# Patient Record
Sex: FEMALE | Race: WHITE
[De-identification: names, ages, dates, MRNs, and addresses within clinical notes are randomized per-mention and may not be internally consistent; named-entity substitution may affect disease eponyms.]

---
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
## 2019-04-23 ENCOUNTER — HOSPITAL ENCOUNTER (EMERGENCY)
Dept: HOSPITAL 25 - UCEAST | Age: 19
Discharge: HOME | End: 2019-04-23
Payer: COMMERCIAL

## 2019-04-23 VITALS — DIASTOLIC BLOOD PRESSURE: 63 MMHG | SYSTOLIC BLOOD PRESSURE: 113 MMHG

## 2019-04-23 DIAGNOSIS — Z91.048: ICD-10-CM

## 2019-04-23 DIAGNOSIS — S49.92XA: Primary | ICD-10-CM
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
## 2019-04-23 DIAGNOSIS — Z88.4: ICD-10-CM
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
## 2019-04-23 DIAGNOSIS — Z88.5: ICD-10-CM

## 2019-04-23 DIAGNOSIS — Z88.8: ICD-10-CM

## 2019-04-23 DIAGNOSIS — Z88.7: ICD-10-CM

## 2019-04-23 PROCEDURE — G0463 HOSPITAL OUTPT CLINIC VISIT: HCPCS

## 2019-04-23 PROCEDURE — 99212 OFFICE O/P EST SF 10 MIN: CPT

## 2019-04-23 NOTE — UC
Shoulder Pain HPI





- HPI Summary


HPI Summary: 





20 yo female with the onset of Left shoulder pain about one hour after doing 

some pullups


Pain is anterior


pain with abduction


no neck pain














- History of Current Complaint


Chief Complaint: UCUpperExtremity


Stated Complaint: ARM PAIN


Time Seen by Provider: 04/23/19 18:17


Hx Obtained From: Patient


Hx Last Menstrual Period: daily birth control, LMP unknown


Onset/Duration: Gradual Onset, Lasting Hours


Timing: Constant


Severity Initially: Mild


Severity Currently: Moderate


Pain Intensity: 6


Pain Scale Used: 0-10 Numeric


Character: Aching, Throbbing


Aggravating Factor(s): Movement, Abduction


Alleviating Factor(s): Rest


Associated Signs And Symptoms: Positive: Negative


Related History: Dominant Hand Right


Torso: 


  __________________________














  __________________________





 1 - pain here, painful when she attemtps to abduct >90








- Allergies/Home Medications


Allergies/Adverse Reactions: 


 Allergies











Allergy/AdvReac Type Severity Reaction Status Date / Time


 


chlorhexidine Allergy Severe Hives/Diff. Verified 04/23/19 17:42





   Breathing/I  





   tching  


 


tramadol Allergy Severe Rash Verified 04/23/19 17:42


 


nickel Allergy Intermediate Rash Verified 04/23/19 17:42


 


famotidine Allergy  Rash Verified 04/23/19 17:42


 


lidocaine Allergy  Hives Verified 04/23/19 17:42


 


hepatits b vaccine Allergy Severe Anaphylatic Uncoded 04/23/19 17:42





   Shock  


 


ADHESIVES Allergy Intermediate Rash Uncoded 04/23/19 17:42


 


ALLERGY SHOT Allergy Unknown Anaphylatic Uncoded 04/23/19 17:42





   Shock  


 


TAGIDERM Allergy  Hives Uncoded 04/23/19 17:42











Home Medications: 


 Home Medications





Atenolol TAB* [Tenormin TAB* 25 MG] 25 mg PO DAILY 04/23/19 [History Confirmed 

04/23/19]











PMH/Surg Hx/FS Hx/Imm Hx


Previously Healthy: Yes


Cardiovascular History: Other - recurrent VT,  ablations x 2


GI/ History: Ulcer





- Surgical History


Surgical History: Yes


Surgery Procedure, Year, and Place: CARDIAC ABLASION 12/29/15 and possible 

2017.  WISDOM TEETH.  Loop monitor placement and removal 2018





- Family History


Known Family History: Positive: Hypertension, Other - anasthesia reaction - 

mother (vomiting), no malignant hyperthermia





- Social History


Alcohol Use: Weekly


Substance Use Type: Marijuana


Substance Use Comment - Amount & Last Used: occasionally for joint pain per pt.


Smoking Status (MU): Never Smoked Tobacco


Have You Smoked in the Last Year: No





Review of Systems


All Other Systems Reviewed And Are Negative: Yes


Constitutional: Positive: Negative


Skin: Positive: Negative


Eyes: Positive: Negative


ENT: Positive: Negative


Respiratory: Positive: Negative


Cardiovascular: Positive: Negative


Gastrointestinal: Positive: Negative


Genitourinary: Positive: Negative


Motor: Positive: Negative


Neurovascular: Positive: Negative


Musculoskeletal: Positive: Arthralgia


Neurological: Positive: Negative


Psychological: Positive: Negative





Physical Exam


Triage Information Reviewed: Yes


Appearance: Well-Appearing, No Pain Distress, Well-Nourished


Vital Signs: 


 Initial Vital Signs











Temp  99.2 F   04/23/19 17:36


 


Pulse  89   04/23/19 17:36


 


Resp  16   04/23/19 17:36


 


BP  113/63   04/23/19 17:36


 


Pulse Ox  99   04/23/19 17:36











Vital Signs Reviewed: Yes


Eyes: Positive: Conjunctiva Clear


ENT: Positive: Hearing grossly normal.  Negative: Nasal congestion, Nasal 

drainage, Trismus, Muffled voice, Hoarse voice


Dental Exam: Normal


Neck: Positive: Supple, Nontender, No Lymphadenopathy


Respiratory: Positive: Lungs clear, Normal breath sounds, No respiratory 

distress, No accessory muscle use, Respiratory distress


Cardiovascular: Positive: RRR, No Murmur.  Negative: Tachycardia, Bradycardia


Musculoskeletal: Positive: ROM Limited @ - able to abduct to 90 degrees (L 

shoulder), full int and ext ROM


Neurological: Positive: Alert


Psychological Exam: Normal


Skin Exam: Normal





Shoulder Course/Dx





- Differential Dx/Diagnosis


Provider Diagnosis: 


 Injury of left shoulder








Discharge





- Sign-Out/Discharge


Documenting (check all that apply): Patient Departure


All imaging exams completed and their final reports reviewed: No Studies





- Discharge Plan


Condition: Stable


Disposition: HOME


Patient Education Materials:  How to Use a Sling (ED), Shoulder Sprain (ED)


Referrals: 


[facility] [Outside] (I suggest you get rechecked next week 

if not better)


Additional Instructions: 


sling


ice


tylenol











- Billing Disposition and Condition


Condition: STABLE


Disposition: Home

## 2019-04-23 NOTE — XMS REPORT
Continuity of Care Document (CCD)

 Created on:2019



Patient:Beba Orlando

Sex:Female

:2000

External Reference #:[id number]





Demographics







 Address  [address] [place] [address]

 

 Home Phone  [phone]

 

 Mobile Phone  [phone]

 

 Email Address  [email or web address]

 

 Preferred Language  en

 

 Marital Status  Not  or 

 

 Catholic Affiliation  Unknown

 

 Race  White

 

 Ethnic Group  Not  or 









Author







 Name  HOWARD Gomez

 

 Address  [address]



   Unavailable



   [place] [address]









Support







 Name  Relationship  Address  Phone

 

 Lisbeth Orlando  Mother  Unavailable  [phone]









Care Team Providers







 Name  Role  Phone

 

 Tristan Delgado  Care Team Information   Unavailable

 

 Tristan Delgado  Primary Care Physician  Unavailable









Payers







 Date  Identification Numbers  Payment Provider  Subscriber

 

 Effective: 2017  Policy Number: [id number]  BC/BS Of [employer] Greenean Karlos









 PayID: [id number]  [address] [place] [id number]







Advance Directives







 Description

 

 No Information Available







Problems







 Active Problems  Provider  Date

 

 Lyme disease  Tristan Delgado M.D.  Onset: 2015

 

 Paroxysmal ventricular tachycardia  Tristan Delgado M.D.  Onset: 2015

 

 Epigastric pain  Tristan Delgado M.D.  Onset: 2015

 

 Acute upper respiratory infection  Tristan Delgado M.D.  Onset: 2015

 

 Contusion of foot  Tristan Delgado M.D.  Onset: 2015

 

 Open wound of toe without complication  Tristan Delgado M.D.  Onset: 2015

 

 Myalgia  Tristan Delgado M.D.  Onset: 2015

 

 Inflammatory polyarthropathy  Tristan Delgado M.D.  Onset: 2015

 

 Infectious colitis, enteritis and  Tristan Delgado M.D.  Onset: 01/15/2016



 gastroenteritis    

 

 Sarcoid heart muscle disease  Tristan Delgado M.D.  Onset: 2016

 

 Malaise and fatigue  Tristan Delgado M.D.  Onset: 2016

 

 High risk sexual behavior  Tristan Delgado M.D.  Onset: 2016

 

 Pain of breast  Tristan Delgado M.D.  Onset: 2017

 

 Generalized anxiety disorder  Tristan Delgado M.D.  Onset: 2017

 

 Posttraumatic stress disorder  Tristan Delgado M.D.  Onset: 2017

 

 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
2017



 predominantly inattentive type    

 

 Acute bronchitis  Tristan Delgado M.D.  Onset: 10/15/2018

 

 Chest pain  Tristan Delgado M.D.  Onset: 10/15/2018







Family History







 Date  Family Member(s)  Observation  Comments

 

   Father  Hypothyroidism  

 

   Father  62  

 

   Father  Osteoporosis  

 

   Father  Osteoarthritis  

 

   Mother  Asthma  

 

   Mother  50  

 

   Mother  Seasonal Allergies  

 

   Mother  Irritable Bowel Syndrome  







Social History







 Type  Date  Description  Comments

 

 Birth Sex    Unknown  

 

 Marital Status    Single  

 

 Lives With    Male Partner  

 

 Diet    Healthy, Well Balanced  

 

 Tobacco Use  Start: Unknown  Never Smoked Cigarettes  

 

 Smoking Status  Reviewed: 18  Never Smoked Cigarettes  

 

 ETOH Use    Denies alcohol use  

 

 Tobacco Use  Start: Unknown  nonsmoker  

 

 Recreational Drug Use    Marijuana  occ

 

 Exercise Type/Frequency    Exercises sporadically  

 

 Sun Exposure    Uses sunscreen  occ also does not



       stay in strong sun

 

 Seat Belt/Car Seat    Always uses seat belt  







Allergies, Adverse Reactions, Alerts







 Active Allergies  Reaction  Severity  Comments  Date

 

 Hepatitis B Vaccine  Anaphylaxis      2015

 

 Nickel        2015

 

 Environmental        2015

 

 Pepcid  full body rash      2015

 

 Chlorhexidine  blistering rash      2015

 

 Tramadol  hives, difficulty breathing  Severe    10/12/2015

 

 Adhesives  Contact dermatitis    except paper tape  2015

 

 Topical Mark Meds        2017







Medications







 Active Medications  SIG  Qnty  Indications  Ordering  Date



         Provider  

 

 Note  Due To Health  Needs to be able to      Tristan Delgado,  10/15/2018



 Issues  use elevator due to      M.D.  



   heart condition.        

 

 Ondansetron  take one tablet by  30tabs    Tristan Delgado,  2017



           4mg Tablets  mouth four times a      M.D.  



 Dispers  day as needed for        



   nausea        

 

 Atenolol  1  po bid prn      Unknown  



        50mg Tablets          



           

 

 Ogestrel  take 1 tablet by  3packs    Brenda Mora  



        0.5-50mg-mcg  mouth every day      SNEHAL Major  



 Tablets  continuously        



           

 

 Desloratadine  1 by mouth every      Unknown  



             5mg  day        



 Tablets          



           









 History Medications









 Azithromycin  2 by mouth today  6tabs  J20.9  Tristan Delgado,  10/15/2018 -



              250mg  and 1 by mouth x 4      M.D.  2018



 Tablets  days        



           

 

 Macrobid  take one by mouth  10caps  R30.0  Gemma CHAWLA  2018 -



          100mg  twice daily until      Tejas, SNEHAL  2018



 Capsules  gone        



           

 

 Strattera  1 PO AT hs  30caps  F90.0  Tristan Delgado,  2017 -



           25mg        M.D.  2018



 Capsules          



           

 

 Bupropion HCL ER  take 1 tablet by  30tabs  F90.0  Tristan Delgado,  10/24/2017 
-



 (SR)  mouth every      M.D.  2017



      100mg Tablets  morning        



 ER 12HR          



           

 

 Note For Work  was  seen  here      Nicho JURADO  2017 -



   15  and      ROLAN Mireles  2017



   could not work  or        



    be at  school        



   that  day        

 

 Note  Due To Health  Needs to be    F41.1  Tristan Delgado,  2017 -



 Issues  allowed to have an      M.DCandido  2018



   emotional support        



   dog.        

 

 Atovaquone  10 ML PO qd  210ml    Tristan Delgado,  2017 -



         M.D.  2017



 750mg/5ML          



 Suspension          



           

 

 Doxycycline  Take 1 Capsule By  60caps    Nicho JURADO  2017 -



 Monohydrate  Mouth Two Times      ROLAN Mireles  2017



             100mg  Daily        



 Capsules          



           

 

 Note  Due To Health  Unable to work at      Tristan Delgado,  2016 -



 Issues  present time due      M.DCandido  10/18/2016



   to fatigue from        



   chronic lyme        



   disease and heart        



   disease.        

 

 Note  please remove picc      Tristan Delgado,  2016 -



   line      M.D.  2016

 

 PICC Line Dressing  weekly and kian      Nicho JURADO  2016 -



 Changes  picc line dressing      ROLAN Mireles  2016



   changes per        



   protocal        

 

 Penicillin V  1 by mouth twice a  20tabs    Jamar Echavarria,  2015 -



 Potassium  day      M.D.  2016



           500mg          



 Tablets          



           

 

 Lansoprazole  take one capsule      [facility]  2015 -



              30mg  by mouth every day      [facility]  10/18/2016



 Capsules DR  as needed      [place]  



           

 

 Fluconazole  1 by mouth for  2tabs    Tristan Delgado,  2015 -



             150mg  yeast infection.      M.D.  2015



 Tablets  may repeat in 5-7        



   days        

 

 Note  discontinue PICC      Tristan Delgado,  10/30/2015 -



   line      M.D.  2015

 

 PICC Line Dressing  Weekly picc line      Jacquelyn Ramos,  2015 -



 Changes  dressing changes      M.D.  2016



   per protocal        

 

 Ceftriaxone Sodium  infuse 5 days per    A69.29  Tristan Delgado,  2015 -



   week times 30 days      M.D.  2016



 2gm Solution Rec          



           

 

 Nystatin  apply to affected  1bottle    Jacquelyn Ramos,  2015 -



           Powder  area as directed      M.D.  2015



           

 

 Heparin Lock Flush  as directed  30units    Tristan Delgado,  2015 -



 For Flushing        M.D.  10/30/2015



 Vascular Access          



 Devices          



         100Unit/ML          



 Solution          



           

 

 Famotidine  1 twice a day as  60tabs  789.06  Tristan Delgado,  2015 -



            20mg  needed for stomach      M.D.  2015



 Tablets          



           

 

 Ceftriaxone Sodium  Infuse 5 days per    088.81  Tristan Delgado,  2015 -



   week times 30 days      M.D.  2015



 2gm Solution Rec          



           

 

 Amoxicillin  Take One Tablet By  90tabs  A69.29  Tristan Delgado,  2015 -



             875mg  Mouth Three Times      M.D.  2015



 Tablets  A Day        



           

 

 Atovaquone-Proguani  1 by mouth every  14tabs  088.81  Tristan Delgado,  2015 -



 l HCL  day      M.D.  2015



       250-100mg          



 Tablets          



           

 

 Naproxen  Take One Tablet By  60tabs    Tristan Delgado,  2015 -



          500mg  Mouth Twice A Day      M.D.  2015



 Tablets  as Needed For Pain        



           

 

 Azithromycin  Take One Tablet By  30tabs  088.81  Tristan Delgado,  2015 -



              500mg  Mouth Daily      M.D.  2015



 Tablets          



           

 

 Cefuroxime Axetil  1 by mouth twice a  60tabs  088.81  Tristan Delgado,  2015 -



   day      M.D.  2015



 500mg Tablets          



           

 

 Birth Control Pill        Tristan Delgado,  2015 -



         M.D.  10/12/2015

 

 Azithromycin  1 by mouth every      Unknown   -



   day        2016



 Tablets          



           

 

 Mepron  5ml bid      Unknown   -



         Suspension          2016



           

 

 Atenolol  1 by mouth twice      Unknown   -



          25mg  daily        2017



 Tablets          



           

 

 Metoprolol Tartrate  take one tablet by  60tabs    Tristan Delgado,   
-



   mouth twice a day      M.D.  2016



 25mg Tablets          



           

 

 Ibuprofen  2 by mouth every 6      Unknown   -



           200mg  hours as needed        01/15/2016



 Tablets          



           

 

 Probiotic  2 po bid      Unknown   -



            Capsules          2016



           

 

 Flecainide Acetate  2 by mouth twice a      Unknown   -



   day        2017



 50mg Tablets          



           

 

 Hydrocodone-Acetami  1 every 6 hours as  40tabs    Jamar Echavarria,   -



 nophen  needed for tooth      M.D.  2016



        5-325mg  pain - disregard        



 Tablets  previous Rx for        



   "every 12 hours"        



   from today        

 

 Tramadol HCL  1 by mouth every 6      Unknown   -



              50mg  hours as needed        10/12/2015



 Tablets          



           

 

 Naproxen  1 po bid      Unknown   -



           Tablets          2015



           

 

 Flecainide Acetate  1 po bid      Unknown   -



           2015



 100mg Tablets          



           







Medications Administered in Office







 Medication  SIG  Qnty  Indications  Ordering Provider  Date

 

 TB Intradermal Test        Jacinda Baxter  2018



             Injection          



           

 

 TB Intradermal Test        Tristan Delgado M.D.  2016



             Injection          



           







Immunizations







 CPT Code  Status  Date  Vaccine  Lot #

 

 [id number]  Given  2018  Tdap Tetanus, W Pertussis  [id number]

 

 37894  Given  2013  Hepatitis B Immunization, [place]-19 Years  

 

 88100  Given  10/18/2012  Hepatitis B Immunization, [place]-19 Years  

 

 15924  Given  2012  Hepatitis B Immunization, [place]-19 Years  

 

 56268  Given  2008  Tdap Tetanus, W Pertussis  

 

 74960  Given  2007  MMR Virus Immunization  

 

 92814  Given  2001  MMR Virus Immunization  

 

 01600  Given  1999  DTaP Immunization  

 

 69812  Given  1999  Hib PRP-T Conjugate 4 Dose Schedule  

 

 92707  Given  1998  DTaP Immunization  

 

 68438  Given  1998  (IPV) Inactive Poliovirus Vaccine  

 

 35805  Given  1998  (IPV) Inactive Poliovirus Vaccine  

 

 07892  Given  1998  DTaP Immunization  

 

 10745  Given  1998  Hib PRP-T Conjugate 4 Dose Schedule  

 

 04518  Given  1998  Varicella (Chicken Pox) Immunization  

 

 71050  Given  1998  (IPV) Inactive Poliovirus Vaccine  

 

 50300  Given  1998  DTaP Immunization  

 

 20636  Given  1998  Hib PRP-T Conjugate 4 Dose Schedule  







Vital Signs







 Date  Vital  Result  Comment

 

 2019  4:15pm  BP Systolic  118 mmHg  









 BP Diastolic  62 mmHg  

 

 Heart Rate  76 /min  

 

 Body Temperature  98.7 F  

 

 Respiratory Rate  17 /min  

 

 Height  63.5 inches  5'3.50"

 

 Weight  150.00 lb  

 

 BMI (Body Mass Index)  26.2 kg/m2  









 2019  3:53pm  BP Systolic  120 mmHg  









 BP Diastolic  80 mmHg  

 

 Heart Rate  68 /min  

 

 Body Temperature  98.8 F  

 

 Respiratory Rate  16 /min  

 

 Height  63.5 inches  5'3.50"

 

 Weight  154.00 lb  

 

 BMI (Body Mass Index)  26.8 kg/m2  









 10/15/2018  4:48pm  BP Systolic  100 mmHg  









 BP Diastolic  60 mmHg  

 

 Heart Rate  80 /min  

 

 Body Temperature  99.2 F  

 

 Respiratory Rate  18 /min  

 

 Height  63.5 inches  5'3.50"

 

 Weight  142.00 lb  

 

 BMI (Body Mass Index)  24.8 kg/m2  









 2018  2:42pm  BP Systolic  128 mmHg  









 BP Diastolic  60 mmHg  

 

 Heart Rate  68 /min  

 

 Body Temperature  98.6 F  

 

 Respiratory Rate  16 /min  

 

 Height  63.5 inches  5'3.50"

 

 Weight  134.00 lb  

 

 BMI (Body Mass Index)  23.4 kg/m2  

 

 Right Visual Acuity Distance  20/20  

 

 Left Visual Acuity Distance  20/20  









 2018  6:24pm  BP Systolic  120 mmHg  









 BP Diastolic  70 mmHg  

 

 Heart Rate  68 /min  

 

 Body Temperature  98.8 F  

 

 Respiratory Rate  18 /min  

 

 Height  63.5 inches  5'3.50"  measured 17

 

 Weight  133.00 lb  

 

 BMI (Body Mass Index)  23.2 kg/m2  









 2018  2:57pm  BP Systolic  108 mmHg  









 BP Diastolic  60 mmHg  

 

 Heart Rate  72 /min  

 

 Body Temperature  97.6 F  

 

 Respiratory Rate  17 /min  

 

 Height  63.5 inches  5'3.50"  measured 2018  8:45am  BP Systolic  112 mmHg  









 BP Diastolic  60 mmHg  

 

 Heart Rate  64 /min  

 

 Body Temperature  98.2 F  

 

 Height  63.5 inches  5'3.50"  measured 17

 

 Weight  134.00 lb  

 

 BMI (Body Mass Index)  23.4 kg/m2  









 2017  1:59pm  BP Systolic  100 mmHg  









 BP Diastolic  64 mmHg  

 

 Heart Rate  84 /min  

 

 Body Temperature  97.7 F  

 

 Respiratory Rate  16 /min  

 

 Height  63.5 inches  5'3.50"  measured 17

 

 Weight  138.50 lb  

 

 BMI (Body Mass Index)  24.1 kg/m2  

 

 Body Mass Index Percentile  73 %  

 

 Weight Percentile  67th  

 

 Height Percentile  38 %  









 10/24/2017 11:34am  BP Systolic  100 mmHg  









 BP Diastolic  60 mmHg  

 

 Heart Rate  90 /min  

 

 Body Temperature  97.3 F  

 

 Respiratory Rate  16 /min  

 

 Height  63.5 inches  5'3.50"  measured 17

 

 Weight  142.00 lb  

 

 BMI (Body Mass Index)  24.8 kg/m2  

 

 Body Mass Index Percentile  77 %  

 

 Weight Percentile  72nd  

 

 Height Percentile  38 %  









 2017  3:35pm  BP Systolic  100 mmHg  









 BP Diastolic  60 mmHg  

 

 Heart Rate  80 /min  

 

 Body Temperature  98.5 F  

 

 Respiratory Rate  16 /min  

 

 O2 % BldC Oximetry  98 %  

 

 Height  63.5 inches  5'3.50"  measured 17

 

 Weight  137.38 lb  

 

 BMI (Body Mass Index)  24.0 kg/m2  

 

 Body Mass Index Percentile  72 %  

 

 Weight Percentile  66th  

 

 Height Percentile  38 %  









 2017  8:19pm  BP Systolic  104 mmHg  









 BP Diastolic  60 mmHg  

 

 Heart Rate  78 /min  

 

 Body Temperature  98.4 F  

 

 Respiratory Rate  16 /min  

 

 Height  63.5 inches  5'3.50"  measured 17

 

 Weight  142.38 lb  

 

 BMI (Body Mass Index)  24.8 kg/m2  

 

 Body Mass Index Percentile  78 %  

 

 Right Visual Acuity Distance  20/20  

 

 Left Visual Acuity Distance  20.20  









 2017 10:59am  BP Systolic  90 mmHg  









 BP Diastolic  50 mmHg  

 

 Heart Rate  68 /min  

 

 Body Temperature  98.8 F  

 

 Respiratory Rate  16 /min  

 

 Height  64 inches  5'4"

 

 Weight  139.00 lb  

 

 BMI (Body Mass Index)  23.9 kg/m2  

 

 Body Mass Index Percentile  72 %  









 2017  4:11pm  BP Systolic  98 mmHg  









 BP Diastolic  60 mmHg  

 

 Heart Rate  72 /min  

 

 Body Temperature  99.3 F  

 

 Respiratory Rate  16 /min  

 

 Height  64 inches  5'4"

 

 Weight  136.38 lb  

 

 BMI (Body Mass Index)  23.4 kg/m2  

 

 Body Mass Index Percentile  69 %  









 10/18/2016 10:32am  BP Systolic  110 mmHg  









 BP Diastolic  60 mmHg  

 

 Heart Rate  72 /min  

 

 Body Temperature  97.7 F  

 

 Height  64 inches  5'4"

 

 Weight  132.00 lb  

 

 BMI (Body Mass Index)  22.7 kg/m2  

 

 Body Mass Index Percentile  63 %  

 

 Weight Percentile  62nd  

 

 Height Percentile  46 %  









 2016  8:27am  BP Systolic  96 mmHg  









 BP Diastolic  60 mmHg  

 

 Heart Rate  72 /min  

 

 Body Temperature  98.2 F  

 

 Respiratory Rate  16 /min  

 

 Weight  137.00 lb  

 

 Weight Percentile  702016  1:59pm  BP Systolic  118 mmHg  









 BP Diastolic  64 mmHg  

 

 Heart Rate  72 /min  

 

 Body Temperature  98.7 F  

 

 Respiratory Rate  16 /min  

 

 Weight  132.00 lb  

 

 Weight Percentile  63rd  









 2016  2:24pm  BP Systolic  110 mmHg  









 BP Diastolic  68 mmHg  

 

 Heart Rate  68 /min  

 

 Body Temperature  98.3 F  

 

 Respiratory Rate  16 /min  

 

 Weight  130.00 lb  

 

 Weight Percentile  60th  









 2016  4:17pm  BP Systolic  108 mmHg  lying pulse 74









 BP Diastolic  52 mmHg  lying pulse 74

 

 BP Systolic Recheck  100 mmHg  sitting pulse 74

 

 BP Diastolic Recheck  58 mmHg  sitting pulse 74









 2016  1:12pm  BP Systolic  112 mmHg  









 BP Diastolic  70 mmHg  

 

 Heart Rate  68 /min  

 

 Body Temperature  98.7 F  

 

 Respiratory Rate  18 /min  

 

 Weight  132.00 lb  

 

 Weight Percentile  64th  









 2016 11:37am  BP Systolic  114 mmHg  









 BP Diastolic  62 mmHg  

 

 Heart Rate  80 /min  

 

 Body Temperature  98.4 F  

 

 Respiratory Rate  16 /min  

 

 Weight  129.38 lb  

 

 Weight Percentile  602016 11:24am  BP Systolic  90 mmHg  









 BP Diastolic  60 mmHg  

 

 Heart Rate  84 /min  

 

 Body Temperature  97.7 F  

 

 Respiratory Rate  16 /min  

 

 Weight  133.12 lb  

 

 Weight Percentile  66th  









 01/15/2016  2:16pm  BP Systolic  106 mmHg  









 BP Diastolic  64 mmHg  

 

 Heart Rate  78 /min  

 

 Body Temperature  98.1 F  

 

 Respiratory Rate  16 /min  

 

 Weight  130.38 lb  

 

 Weight Percentile  62nd  









 2016  4:32pm  BP Systolic  106 mmHg  









 BP Diastolic  44 mmHg  

 

 Heart Rate  96 /min  

 

 Body Temperature  100.0 F  

 

 Respiratory Rate  16 /min  

 

 Weight  131.25 lb  

 

 Weight Percentile  64th  









 2015  1:59pm  BP Systolic  108 mmHg  









 BP Diastolic  68 mmHg  

 

 Heart Rate  96 /min  

 

 Body Temperature  99.3 F  

 

 Respiratory Rate  16 /min  

 

 Weight  131.00 lb  

 

 Weight Percentile  63rd  









 2015  4:38pm  BP Systolic  106 mmHg  









 BP Diastolic  60 mmHg  

 

 Heart Rate  120 /min  

 

 Body Temperature  98.7 F  

 

 Respiratory Rate  16 /min  

 

 Weight  128.00 lb  

 

 Weight Percentile  59th  









 2015  1:22pm  BP Systolic  120 mmHg  









 BP Diastolic  74 mmHg  

 

 Heart Rate  96 /min  

 

 Body Temperature  100.8 F  

 

 Respiratory Rate  16 /min  

 

 Height  63 inches  5'3"

 

 Weight  127.00 lb  

 

 BMI (Body Mass Index)  22.5 kg/m2  

 

 Body Mass Index Percentile  65 %  

 

 Weight Percentile  57th  

 

 Height Percentile  32 %  









 10/12/2015  2:13pm  BP Systolic  100 mmHg  









 BP Diastolic  60 mmHg  

 

 Heart Rate  64 /min  

 

 Body Temperature  98.5 F  

 

 Respiratory Rate  16 /min  

 

 Height  63 inches  5'3"

 

 Weight  129.25 lb  

 

 BMI (Body Mass Index)  22.9 kg/m2  

 

 Body Mass Index Percentile  69 %  

 

 Weight Percentile  61st  

 

 Height Percentile  32 %  









 2015  2:54pm  BP Systolic  104 mmHg  









 BP Diastolic  60 mmHg  

 

 Heart Rate  84 /min  

 

 Body Temperature  98.6 F  

 

 Respiratory Rate  16 /min  

 

 Height  63 inches  5'3"

 

 Weight  125.00 lb  

 

 BMI (Body Mass Index)  22.1 kg/m2  

 

 Body Mass Index Percentile  62 %  

 

 Weight Percentile  54th  

 

 Height Percentile  32 %  









 2015  5:59pm  BP Systolic  116 mmHg  









 BP Diastolic  54 mmHg  

 

 Heart Rate  90 /min  

 

 Body Temperature  99.1 F  

 

 Respiratory Rate  16 /min  

 

 Height  63 inches  5'3"

 

 Weight  124.12 lb  

 

 BMI (Body Mass Index)  22.0 kg/m2  

 

 Body Mass Index Percentile  60 %  

 

 Weight Percentile  52nd  

 

 Height Percentile  32 %  









 2015  3:21pm  BP Systolic  110 mmHg  









 BP Diastolic  60 mmHg  

 

 Heart Rate  88 /min  

 

 Body Temperature  99.0 F  

 

 Respiratory Rate  16 /min  

 

 Height  63 inches  5'3"

 

 Height Percentile  32 %  









 2015 11:28am  BP Systolic  98 mmHg  









 BP Diastolic  50 mmHg  

 

 Heart Rate  80 /min  

 

 Body Temperature  100.4 F  

 

 Respiratory Rate  16 /min  

 

 Height  63 inches  5'3"

 

 Weight  122.00 lb  

 

 BMI (Body Mass Index)  21.6 kg/m2  

 

 Body Mass Index Percentile  56 %  

 

 Weight Percentile  48th  

 

 Height Percentile  32 %  









 2015 10:03am  BP Systolic  96 mmHg  









 BP Diastolic  50 mmHg  

 

 Heart Rate  84 /min  

 

 Body Temperature  99.0 F  

 

 Respiratory Rate  16 /min  

 

 Height  63 inches  5'3"

 

 Weight  118.25 lb  

 

 BMI (Body Mass Index)  20.9 kg/m2  

 

 Body Mass Index Percentile  48 %  

 

 Weight Percentile  41st  

 

 Height Percentile  32 %  









 2015  3:08pm  BP Systolic  104 mmHg  









 BP Diastolic  60 mmHg  

 

 Heart Rate  84 /min  

 

 Body Temperature  99.2 F  

 

 Respiratory Rate  16 /min  

 

 Height  63 inches  5'3"

 

 Weight  121.38 lb  

 

 BMI (Body Mass Index)  21.5 kg/m2  

 

 Body Mass Index Percentile  55 %  

 

 Weight Percentile  47th  

 

 Height Percentile  32 %  









 2015  1:45pm  BP Systolic  90 mmHg  









 BP Diastolic  60 mmHg  

 

 Heart Rate  84 /min  

 

 Body Temperature  98.2 F  

 

 Respiratory Rate  12 /min  

 

 Height  63 inches  5'3"

 

 Weight  117.38 lb  

 

 BMI (Body Mass Index)  20.8 kg/m2  

 

 Body Mass Index Percentile  47 %  

 

 Weight Percentile  39th  

 

 Height Percentile  32 %  









 2015  4:47pm  BP Systolic  90 mmHg  









 BP Diastolic  54 mmHg  

 

 Heart Rate  84 /min  

 

 Body Temperature  99.3 F  

 

 Respiratory Rate  16 /min  

 

 Weight  121.00 lb  

 

 Weight Percentile  47th  









 2015  2:35pm  BP Systolic  96 mmHg  









 BP Diastolic  68 mmHg  

 

 Heart Rate  90 /min  

 

 Body Temperature  99.0 F  

 

 Respiratory Rate  16 /min  

 

 Weight  125.00 lb  

 

 Weight Percentile  55th  









 2015 12:02pm  BP Systolic  100 mmHg  









 BP Diastolic  60 mmHg  

 

 Heart Rate  84 /min  

 

 Body Temperature  98.7 F  

 

 Respiratory Rate  16 /min  

 

 Weight  127.12 lb  

 

 Weight Percentile  59th  









 2015  2:45pm  BP Systolic  122 mmHg  









 BP Diastolic  60 mmHg  

 

 Heart Rate  84 /min  

 

 Body Temperature  98.9 F  

 

 Respiratory Rate  16 /min  

 

 Weight  130.12 lb  

 

 Weight Percentile  64th  









 2015  9:54am  BP Systolic  124 mmHg  









 BP Diastolic  60 mmHg  

 

 Heart Rate  90 /min  

 

 Body Temperature  99.5 F  

 

 Respiratory Rate  16 /min  

 

 Height  63 inches  5'3" measured 4/9/15

 

 Weight  130.38 lb  

 

 BMI (Body Mass Index)  23.1 kg/m2  

 

 Body Mass Index Percentile  72 %  

 

 Weight Percentile  65th  

 

 Height Percentile  32 %  









 2015  2:38pm  BP Systolic  122 mmHg  









 BP Diastolic  60 mmHg  

 

 Heart Rate  84 /min  

 

 Body Temperature  99.1 F  

 

 Respiratory Rate  16 /min  

 

 Height  63 inches  measured 4/9/15

 

 Weight  128.50 lb  

 

 BMI (Body Mass Index)  22.8 kg/m2  

 

 Body Mass Index Percentile  69 %  

 

 Weight Percentile  62nd  

 

 Height Percentile  33 %  







Results







 Test  Date  Facility  Test  Result  H/L  Range  Note

 

 Laboratory test  2019  [facility]  Clotest  SEE RESULT      1, 2



 finding        BELOW      

 

 Laboratory test  2019  [facility]  Surgical  SEE RESULT      3, 4



 finding      Interface Order  BELOW      

 

 Laboratory test  2018  [facility]  Quickstrep  NEGATIVE    Negative
  



 finding    (607)-   -          

 

 CBC Auto Diff  2018  [facility]  White Blood  6.7 10^3/uL  N  3.5-10.8  5



       Count        









 Red Blood Count  4.64 10^6/uL  N  4.0-5.4  

 

 Hemoglobin  14.2 g/dL  N  12.0-16.0  

 

 Hematocrit  42 %  N  35-47  

 

 Mean Corpuscular Volume  90 fL  N  80-97  

 

 Mean Corpuscular Hemoglobin  31 pg  N  27-31  

 

 Mean Corpuscular HGB Conc  34 g/dL  N  31-36  

 

 Red Cell Distribution Width  13 %  N  10.5-15  

 

 Platelet Count  257 10^3/uL  N  150-450  

 

 Mean Platelet Volume  9.6 um3  N  7.4-10.4  

 

 Abs Neutrophils  4.3 10^3/uL  N  1.5-7.7  

 

 Abs Lymphocytes  1.8 10^3/uL  N  1.0-4.8  

 

 Abs Monocytes  0.5 10^3/uL  N  0-0.8  

 

 Abs Eosinophils  0 10^3/uL  N  0-0.6  

 

 Abs Basophils  0.1 10^3/uL  N  0-0.2  

 

 Abs Nucleated RBC  0 10^3/uL      

 

 Granulocyte %  64.0 %  N  38-83  

 

 Lymphocyte %  27.0 %  N  25-47  

 

 Monocyte %  7.5 %  High  0-7  

 

 Eosinophil %  0.5 %  N  0-6  

 

 Basophil %  1.0 %  N  0-2  

 

 Nucleated Red Blood Cells %  0      









 Comp Metabolic Panel  2018  [facility]  Sodium  138 mmol/L  Low  139-145  









 Potassium  4.0 mmol/L  N  3.5-5.0  

 

 Chloride  107 mmol/L  N  101-111  

 

 Co2 Carbon Dioxide  24 mmol/L  N  22-32  

 

 Anion Gap  7 mmol/L  N  2-11  

 

 Glucose  78 mg/dL  N    

 

 Blood Urea Nitrogen  6 mg/dL  N  6-24  

 

 Creatinine  0.66 mg/dL  N  0.51-0.95  

 

 BUN/Creatinine Ratio  9.1  N  8-20  

 

 Calcium  9.6 mg/dL  N  8.6-10.3  

 

 Total Protein  6.6 g/dL  N  6.4-8.9  

 

 Albumin  4.2 g/dL  N  3.2-5.2  

 

 Globulin  2.4 g/dL  N  2-4  

 

 Albumin/Globulin Ratio  1.8  N  1-3  

 

 Total Bilirubin  0.30 mg/dL  N  0.2-1.0  

 

 Alkaline Phosphatase  62 U/L  N    

 

 Alt  18 U/L  N  7-52  

 

 Ast  15 U/L  N  13-39  

 

 Egfr Non-  114.2    >60  

 

 Egfr   146.8    >60  6









 Laboratory test finding  2018  [facility]  Magnesium  2.0 mg/dL  N  1.9-2.7  7









 HCG Pregnancy  < 0.60 mIU/mL      8

 

 TSH (Thyroid Stim Horm)  1.28 mcIU/mL  N  0.34-5.60  9









 Laboratory test  2018  [facility]  Poc Pregnancy,  Negative    Negative  10



 finding      Urine        

 

 Laboratory test  2018  [facility]  Tryptase  2.5 ng/mL    <11.5  11



 finding              

 

 Laboratory test  2018  [facility]  Urine Culture And  SEE RESULT      12



 finding      Sensitivities  BELOW      

 

 Ua - Micro ([facility])  2018  [facility]  Appearance  slightly  #    



     (607)-   -    cloudy      









 Color  yellow      

 

 Glucose, Urine ([facility])  neg      

 

 Bilirubin  neg      

 

 Ketones  neg      

 

 SP Grav  >=1.030      

 

 Blood  Trace-lysed  #    

 

 PH  6.0      

 

 Protein  trace ssa  #    

 

 Urobil  0.2      

 

 Nitrite  Negative      

 

 Leukocytes ([facility])  sm  #    

 

 Hyaline  - /Lpf      

 

 Granular  - /Lpf      

 

 WBC ([facility]ma,[facility])  50-60  #    

 

 RBC  3-5  #    

 

 Mucus (Fma/CBC/[facility]rex)  - /Lpf      

 

 Epith  few /Lpf  #    

 

 Bacteria  3+ /Hpf  #    

 

 Amorphous ([facility])  - /Lpf      

 

 Crystals, Fluid ([facility])  -      

 

 Z#Comments  -      









 Ua - Micro ([facility]ma)  2017  [facility]  Appearance  slightly cloudy    
  



     (607)-   -          









 Color  yellow      

 

 Glucose, Urine ([facility])  neg      

 

 Bilirubin  neg      

 

 Ketones  neg      

 

 SP Grav  1.010      

 

 Blood  trace-intact  #    

 

 PH  6.0      

 

 Protein  neg      

 

 Urobil  0.2      

 

 Nitrite  neg      

 

 Leukocytes ([facility])  small  #    

 

 WBC ([facility]ma,[facility])  7-8  #    

 

 RBC  0-2  #    

 

 Epith  rare /Lpf  #    

 

 Bacteria  2+ /Hpf  #    









 Lyme, Western Blot,  2017  [facility]  IgG P93 Ab.  Absent      13



 Serum    [address] [place] [address]          



     (607)-   -          









 IgG P66 Ab.  Absent      

 

 IgG P58 Ab.  Absent      

 

 IgG P45 Ab.  Absent      

 

 IgG P41 Ab.  Absent      

 

 IgG P39 Ab.  Absent      

 

 IgG P30 Ab.  Absent      

 

 IgG P28 Ab.  Absent      

 

 IgG P23 Ab.  Absent      

 

 IgG P18 Ab.  Absent      

 

 Lyme IgG WB Interp.  Negative      14

 

 IgM P41 Ab.  Absent      

 

 IgM P39 Ab.  Absent      

 

 IgM P23 Ab.  Present  Abnormal    

 

 Lyme IgM WB Interp.  Negative      15









 CBC Auto Diff  2017  [facility]  White Blood Count  7.4 10^3/uL  N  3.5-10.8  









 Red Blood Count  4.74 10^6/uL  N  4.0-5.4  

 

 Hemoglobin  14.3 g/dL  N  12.0-16.0  

 

 Hematocrit  42 %  N  35-47  

 

 Mean Corpuscular Volume  89 fL  N  80-97  

 

 Mean Corpuscular Hemoglobin  30 pg  N  27-31  

 

 Mean Corpuscular HGB Conc  34 g/dL  N  31-36  

 

 Red Cell Distribution Width  12 %  N  10.5-15  

 

 Platelet Count  278 10^3/uL  N  150-450  

 

 Mean Platelet Volume  9 um3  N  7.4-10.4  

 

 Abs Neutrophils  3.6 10^3/uL  N  1.5-7.7  

 

 Abs Lymphocytes  2.8 10^3/uL  N  1.0-4.8  

 

 Abs Monocytes  0.8 10^3/uL  N  0-0.8  

 

 Abs Eosinophils  0.1 10^3/uL  N  0-0.6  

 

 Abs Basophils  0.1 10^3/uL  N  0-0.2  

 

 Abs Nucleated RBC  0 10^3/uL  N    

 

 Granulocyte %  49.2 %  N  38-83  

 

 Lymphocyte %  37.8 %  N  25-47  

 

 Monocyte %  11.1 %  High  1-9  

 

 Eosinophil %  1.0 %  N  0-6  

 

 Basophil %  0.9 %  N  0-2  

 

 Nucleated Red Blood Cells %  0.1  N    









 Laboratory test finding  2017  [facility]  Lactic Acid  1.2 mmol/L  N  0.5-2.0  
16

 

 Laboratory test finding  2017  [facility]  Magnesium  2.3 mg/dL  N  1.9-2.7  









 Troponin I  0.00 ng/mL  N  <0.04  17









 Comp Metabolic Panel  2017  [facility]  Sodium  137 mmol/L  N  133-145  









 Potassium  3.5 mmol/L  N  3.5-5.0  

 

 Chloride  106 mmol/L  N  101-111  

 

 Co2 Carbon Dioxide  27 mmol/L  N  22-32  

 

 Anion Gap  4 mmol/L  N  2-11  

 

 Glucose  71 mg/dL  N    

 

 Blood Urea Nitrogen  7 mg/dL  N  6-24  

 

 Creatinine  0.71 mg/dL  N  0.51-0.95  

 

 BUN/Creatinine Ratio  9.9  N  8-20  

 

 Calcium  9.3 mg/dL  N  8.6-10.3  

 

 Total Protein  7.1 g/dL  N  6.4-8.9  

 

 Albumin  4.2 g/dL  N  3.2-5.2  

 

 Globulin  2.9 g/dL  N  2-4  

 

 Albumin/Globulin Ratio  1.4  N  1-3  

 

 Total Bilirubin  0.20 mg/dL  N  0.2-1.0  

 

 Alkaline Phosphatase  59 U/L  N    

 

 Alt  12 U/L  N  7-52  

 

 Ast  11 U/L  Low  13-39  

 

 Egfr Non-  107.2  N  >60  

 

 Egfr   137.9  N  >60  18









 Laboratory test finding  10/18/2016  [facility]  TSH  0.88 mIU/L    0.50-
6.00  









 Free T4  1.32 ng/dL    0.75-1.54  

 

 Free T3  3.98 pg/mL    2.00-4.90  









 Laboratory test  2016  [facility]  HCG Pregnancy  < 0.60 mIU/mL  N    19



 finding              

 

 Laboratory test  2016  [facility]  TSH (Thyroid Stim  0.44 mcIU/mL  N  0.34-
5.60  



 finding      Horm)        









 T3 Free  5.10 pg/mL  High  2.5-3.9  

 

 Free T4 (Free Thyroxine)  0.91 ng/dL  N  0.61-1.12  









 Liver Function Panel  2016  [facility]  Direct Bilirubin  0.10 mg/dL  N  0.03-
0.18  









 Indirect Bilirubin  0.4 mg/dL  N  0.3-1.0  









 Comp Metabolic Panel  2016  [facility]  Sodium  136 mmol/L  N  133-145  









 Potassium  4.1 mmol/L  N  3.5-5.0  

 

 Chloride  104 mmol/L  N  101-111  

 

 Co2 Carbon Dioxide  25 mmol/L  N  22-32  

 

 Anion Gap  7 mmol/L  N  2-11  

 

 Glucose  108 mg/dL  High    

 

 Blood Urea Nitrogen  10 mg/dL  N  6-24  

 

 Creatinine  0.71 mg/dL  N  0.51-0.95  

 

 BUN/Creatinine Ratio  14.1  N  8-20  

 

 Calcium  9.7 mg/dL  N  8.6-10.3  

 

 Total Protein  6.8 g/dL  N  6.4-8.9  

 

 Albumin  4.4 g/dL  N  3.2-5.2  

 

 Globulin  2.4 g/dL  N  2-4  

 

 Albumin/Globulin Ratio  1.8  N  1-3  

 

 Total Bilirubin  0.50 mg/dL  N  0.2-1.0  

 

 Alkaline Phosphatase  62 U/L  N    

 

 Alt  19 U/L  N  7-52  

 

 Ast  17 U/L  N  13-39  

 

 Egfr Non-  107.2  N  >60  

 

 Egfr   137.9  N  >60  20









 CBC Auto Diff  2016  [facility]  White Blood Count  7.9 10^3/uL  N  3.5-10.8  









 Red Blood Count  4.72 10^6/uL  N  4.0-5.4  

 

 Hemoglobin  14.3 g/dL  N  12.0-16.0  

 

 Hematocrit  42 %  N  35-47  

 

 Mean Corpuscular Volume  90 fL  N  80-97  

 

 Mean Corpuscular Hemoglobin  30 pg  N  27-31  

 

 Mean Corpuscular HGB Conc  34 g/dL  N  31-36  

 

 Red Cell Distribution Width  13 %  N  10.5-15  

 

 Platelet Count  244 10^3/uL  N  150-450  

 

 Mean Platelet Volume  9 um3  N  7.4-10.4  

 

 Abs Neutrophils  5.8 10^3/uL  N  1.5-7.7  

 

 Abs Lymphocytes  1.7 10^3/uL  N  1.0-4.8  

 

 Abs Monocytes  0.3 10^3/uL  N  0-0.8  

 

 Abs Eosinophils  0 10^3/uL  N  0-0.6  

 

 Abs Basophils  0 10^3/uL  N  0-0.2  

 

 Abs Nucleated RBC  0 10^3/uL  N    

 

 Granulocyte %  73.5 %  N  38-83  

 

 Lymphocyte %  21.3 %  Low  25-47  

 

 Monocyte %  4.0 %  N  1-9  

 

 Eosinophil %  0.6 %  N  0-6  

 

 Basophil %  0.6 %  N  0-2  

 

 Nucleated Red Blood Cells %  0.1  N    









 Laboratory test  2016  [facility]  Pregnancy,  neg      



 finding    (607)-   -  Serum        

 

 Laboratory test  2016  [facility]  RPR  Non Reactive    Non Reactive  21



 finding    [address] [place] [address]          



     (607)-   -          

 

 HIV 1/O/2 Ag/AB  2016  [facility]  HIV Screen 4th  Non Reactive    Non 
Reactive  



 Prelim    [address] [facility]  [address]        



 [address] [place] [address]          



 Sup    (607)-   -          

 

 Laboratory test  2016  [facility]  HCV Antibody  <0.1    0.0-0.9  22



 finding    [address] [facility]    s/coratio      



     [place] [address]          



     (607)-   -          

 

 Urinalysis  2016  [facility]  Urine Color  Straw  N    



 Profile              









 Urine Appearance  Cloudy  N    

 

 Urine Specific Gravity  1.003  Low  1.010-1.030  

 

 Urine pH  6.0  N  5-9  

 

 Urine Urobilinogen  Negative  N  Negative  

 

 Urine Ketones  Negative  N  Negative  

 

 Urine Protein  Negative  N  Negative  

 

 Urine Leukocytes  3+  Abnormal  Negative  

 

 Urine Blood  Negative  N  Negative  

 

 Urine Nitrite  Negative  N  Negative  

 

 Urine Bilirubin  Negative  N  Negative  

 

 Urine Glucose  Negative  N  Negative  

 

 Urine White Blood Cell  1+(6-10/hpf)  Abnormal  Absent  

 

 Urine Red Blood Cell  Absent  N  Absent  

 

 Urine Bacteria  1+  Abnormal  Absent  

 

 Urine Squamous Epithelial Cell  Present  Abnormal  Absent  

 

 Urine Transitional Epithelial  Present  Abnormal  Absent  









 Laboratory test  2016  [facility]  Urine Culture And  SEE RESULT BELOW      23



 finding      Sensitivities        

 

 Laboratory test  2016  [facility]  Partial Thrombo Time  28.5 seconds  N  26.0-
36.  



 finding      PTT      3  









 D Dimer Quantitative  < 200 ng/mL  N  Less Than 230  24









 CBC Auto Diff  2016  [facility]  White Blood Count  6.7 10^3/uL  N  3.5-10.8  









 Red Blood Count  4.63 10^6/uL  N  4.0-5.4  

 

 Hemoglobin  13.9 g/dL  N  12.0-16.0  

 

 Hematocrit  41 %  N  35-47  

 

 Mean Corpuscular Volume  89 fL  N  80-97  

 

 Mean Corpuscular Hemoglobin  30 pg  N  27-31  

 

 Mean Corpuscular HGB Conc  34 g/dL  N  31-36  

 

 Red Cell Distribution Width  13 %  N  10.5-15  

 

 Platelet Count  295 10^3/uL  N  150-450  

 

 Mean Platelet Volume  9 um3  N  7.4-10.4  

 

 Abs Neutrophils  3.7 10^3/uL  N  1.5-7.7  

 

 Abs Lymphocytes  2.2 10^3/uL  N  1.0-4.8  

 

 Abs Monocytes  0.5 10^3/uL  N  0-0.8  

 

 Abs Eosinophils  0.1 10^3/uL  N  0-0.6  

 

 Abs Basophils  0.2 10^3/uL  N  0-0.2  

 

 Abs Nucleated RBC  0 10^3/uL  N    

 

 Granulocyte %  55.4 %  N  38-83  

 

 Lymphocyte %  32.9 %  N  25-47  

 

 Monocyte %  7.4 %  N  1-9  

 

 Eosinophil %  1.2 %  N  0-6  

 

 Basophil %  3.1 %  High  0-2  

 

 Nucleated Red Blood Cells %  0.1  N    









 Comp Metabolic Panel  2016  [facility]  Sodium  136 mmol/L  N  133-145  









 Potassium  3.8 mmol/L  N  3.5-5.0  

 

 Chloride  104 mmol/L  N  101-111  

 

 Co2 Carbon Dioxide  25 mmol/L  N  22-32  

 

 Anion Gap  7 mmol/L  N  2-11  

 

 Glucose  108 mg/dL  High    

 

 Blood Urea Nitrogen  8 mg/dL  N  6-24  

 

 Creatinine  0.71 mg/dL  N  0.51-0.95  

 

 BUN/Creatinine Ratio  11.3  N  8-20  

 

 Calcium  9.8 mg/dL  N  8.6-10.3  

 

 Total Protein  7.3 g/dL  N  6.4-8.9  

 

 Albumin  4.4 g/dL  N  3.2-5.2  

 

 Globulin  2.9 g/dL  N  2-4  

 

 Albumin/Globulin Ratio  1.5  N  1-3  

 

 Total Bilirubin  0.20 mg/dL  N  0.2-1.0  

 

 Alkaline Phosphatase  52 U/L  N    

 

 Alt  17 U/L  N  7-52  

 

 Ast  14 U/L  N  13-39  

 

 Egfr Non-  107.2  N  >60  

 

 Egfr   137.9  N  >60  25









 Laboratory test finding  2016  [facility]  Magnesium  2.3 mg/dL  N  1.9-2.7  









 Lipase  17 U/L  N  11.0-82.0  

 

 C Reactive Protein  1.15 mg/L  N  < 5.00  26

 

 Troponin I  0.00 ng/mL  N  <0.03  27









 CKMB  2016  [facility]  CKMB  ng/mL  0.8 ng/mL  N  0.6-6.3  

 

 Laboratory test finding  2016  [facility]  HCG Pregnancy  < 0.60 mIU/mL  N    28









 TSH (Thyroid Stim Horm)  1.44 ?IU/mL  N  0.34-5.60  

 

 B Type Natriuretic Peptide  27 pg/mL  N    29

 

 Lactic Acid  2.2 mmol/L  High  0.5-2.0  30









 No Test Indicated  2016  [facility]  .  See Comment:      31, 32



     [address] [place] [address]          



     (930)-   -          









 Dear Doctor,  See Comment:      33









 Laboratory test  2016  [facility]  No Frozen Received  TNP      34



 finding    [address] [place] [address]          



     (189)-   -          









 Angiotensin-Converting Enzyme  35 U/L    14-82  

 

 Written Authorization  See Comment:      35









 Laboratory test finding  2016  [facility]  Erythrocyte Sed Rate  7 mm/Hr  N  0-
14  

 

 CBC Auto Diff  2016  [facility]  White Blood Count  8.5 10^3/uL  N  3.5-10.8  









 Red Blood Count  5.23 10^6/uL  N  4.0-5.4  

 

 Hemoglobin  15.2 g/dL  N  12.0-16.0  

 

 Hematocrit  47 %  N  35-47  

 

 Mean Corpuscular Volume  89 fL  N  80-97  

 

 Mean Corpuscular Hemoglobin  29 pg  N  27-31  

 

 Mean Corpuscular HGB Conc  33 g/dL  N  31-36  

 

 Red Cell Distribution Width  13 %  N  10.5-15  

 

 Platelet Count  273 10^3/uL  N  150-450  

 

 Mean Platelet Volume  9 um3  N  7.4-10.4  

 

 Abs Neutrophils  5.4 10^3/uL  N  1.5-7.7  

 

 Abs Lymphocytes  2.3 10^3/uL  N  1.0-4.8  

 

 Abs Monocytes  0.5 10^3/uL  N  0-0.8  

 

 Abs Eosinophils  0.3 10^3/uL  N  0-0.6  

 

 Abs Basophils  0 10^3/uL  N  0-0.2  

 

 Abs Nucleated RBC  0 10^3/uL  N    

 

 Granulocyte %  63.6 %  N  38-83  

 

 Lymphocyte %  26.7 %  N  25-47  

 

 Monocyte %  5.7 %  N  1-9  

 

 Eosinophil %  3.4 %  N  0-6  

 

 Basophil %  0.6 %  N  0-2  

 

 Nucleated Red Blood Cells %  0  N    









 Laboratory test finding  2016  [facility]  Inr/Protime  0.97  N  0.89-1.11  









 Partial Thrombo Time PTT  30.7 seconds  N  26.0-36.3  









 Comp Metabolic Panel  2016  [facility]  Sodium  136 mmol/L  N  133-145  









 Potassium  3.8 mmol/L  N  3.5-5.0  

 

 Chloride  104 mmol/L  N  101-111  

 

 Co2 Carbon Dioxide  26 mmol/L  N  22-32  

 

 Anion Gap  6 mmol/L  N  2-11  

 

 Glucose  92 mg/dL  N    

 

 Blood Urea Nitrogen  9 mg/dL  N  6-24  

 

 Creatinine  0.77 mg/dL  N  0.51-0.95  

 

 BUN/Creatinine Ratio  11.7  N  8-20  

 

 Calcium  9.7 mg/dL  N  8.6-10.3  

 

 Total Protein  7.7 g/dL  N  6.4-8.9  

 

 Albumin  4.8 g/dL  N  3.2-5.2  

 

 Globulin  2.9 g/dL  N  2-4  

 

 Albumin/Globulin Ratio  1.7  N  1-3  

 

 Total Bilirubin  0.30 mg/dL  N  0.2-1.0  

 

 Alkaline Phosphatase  64 U/L  N    

 

 Alt  22 U/L  N  7-52  

 

 Ast  16 U/L  N  13-39  

 

 Egfr Non-  97.6  N  >60  

 

 Egfr   125.6  N  >60  36









 Laboratory test finding  2016  [facility]  HCG Pregnancy  < 0.60 mIU/mL  N    37









 TSH (Thyroid Stim Horm)  1.20 ?IU/mL  N  0.34-5.60  









 CKMB  2016  [facility]  CKMB  ng/mL  0.5 ng/mL  Low  0.6-6.3  

 

 Laboratory test finding  2016  [facility]  Magnesium  2.2 mg/dL  N  1.9-2.7  









 C Reactive Protein  < 1.00 mg/L  N  < 5.00  38

 

 Troponin I  0.00 ng/mL  N  <0.03  39









 Basic Metabolic Panel  2016  [facility]  Sodium  140 mmol/L  N  133-145  









 Potassium  4.1 mmol/L  N  3.5-5.0  

 

 Chloride  105 mmol/L  N  101-111  

 

 Co2 Carbon Dioxide  28 mmol/L  N  22-32  

 

 Anion Gap  7 mmol/L  N  2-11  

 

 Glucose  95 mg/dL  N    

 

 Blood Urea Nitrogen  8 mg/dL  N  6-24  

 

 Creatinine  0.66 mg/dL  N  0.51-0.95  

 

 BUN/Creatinine Ratio  12.1  N  8-20  

 

 Calcium  9.5 mg/dL  N  8.6-10.3  

 

 Egfr Non-  116.6  N  >60  

 

 Egfr   150.0  N  >60  40









 Complete Blood Count  2016  [facility]  WBC  5.7 x10^3/UL    3.6-9.6
  









 RBC  4.98 x10^6/UL    3.90-5.70  

 

 HGB  14.6 g/dL    12.1-17.2  

 

 HCT  44 %    36-50  

 

 MCV  88.0 fL    82.2-97.4  

 

 MCH  29.3 pg    27.6-33.3  

 

 MCHC  33.3 g/dL    33.0-35.5  

 

 RDW  13.7 %    11.6-13.7  

 

 PLT  267 x10^3/UL    150-400  

 

 MPV  8.2 fL    7.4-10.4  

 

 Gran #  3.8 x10^3/UL    1.5-7.2  

 

 Lymph#  1.7 x10^3/UL    0.7-4.9  

 

 Mono#  0.2 x10^3/UL    0.1-0.9  

 

 Gran %  65.3 %    42.2-75.2  

 

 Lymph %  31.2 %    20.5-51.1  

 

 Mono%  3.5 %    1.7-9.3  









 Comprehensive Metabolic Prof  2016  [facility]  Sodium  140 mEq/L    
134-149  









 Potassium  4.1 mEq/L    3.6-5.5  

 

 Chloride  105 mEq/L      

 

 Carbon Dioxide  28 mEq/L    21-32  

 

 Glucose  100 mg/dL      

 

 BUN  7 mg/dL    6-26  

 

 Creatinine  0.6 mg/dL    0.6-1.4  

 

 BUN/Creat Ratio  11.7 CALC    8.0-36.0  

 

 Calcium  10.1 mg/dL    8.6-10.2  

 

 Total Protein  7.5 g/dL    6.4-8.3  

 

 Albumin  4.9 g/dL    3.8-5.5  

 

 Globulin  2.6 g/dL    2.0-4.8  

 

 A/G Ratio  1.9 CALC    0.6-2.3  

 

 Alk. Phosphatase  81 U/L      

 

 Alt (SGPT)  23 U/L    7-35  

 

 Ast (Sgot)  27 U/L    5-34  

 

 Total Bilirubin  0.3 mg/dL    0.2-1.3  

 

 GFR Non-African American  >60 ml/min/1.73m^    >=60  

 

 GFR African American  >60 ml/min/1.73m^    >=60  









 Laboratory test finding  2016  [facility]  Saliva Cortisol  186 ng/dL  N  100-
750  41

 

 Laboratory test finding  2016  [facility]  Saliva Cortisol  131 ng/dL  N    42

 

 Laboratory test finding  2016  [facility]  Saliva Cortisol  240 ng/dL  N    43

 

 Laboratory test finding  2016  [facility]  Saliva Cortisol  <50 ng/dL  N  <100  
44

 

 Laboratory test finding  2016  [facility]  Rapid Strep A  SEE RESULT BELOW      
45









 Rapid Influenza A & B Antigen  SEE RESULT BELOW      46









 Comp Metabolic Panel  2016  [facility]  Sodium  137 mmol/L  N  133-145  









 Potassium  3.8 mmol/L  N  3.5-5.0  

 

 Chloride  105 mmol/L  N  101-111  

 

 Co2 Carbon Dioxide  23 mmol/L  N  22-32  

 

 Anion Gap  9 mmol/L  N  2-11  

 

 Glucose  128 mg/dL  High    

 

 Blood Urea Nitrogen  14 mg/dL  N  6-24  

 

 Creatinine  0.65 mg/dL  N  0.51-0.95  

 

 BUN/Creatinine Ratio  21.5  High  8-20  

 

 Calcium  8.9 mg/dL  N  8.6-10.3  

 

 Total Protein  6.7 g/dL  N  6.4-8.9  

 

 Albumin  4.2 g/dL  N  3.2-5.2  

 

 Globulin  2.5 g/dL  N  2-4  

 

 Albumin/Globulin Ratio  1.7  N  1-3  

 

 Total Bilirubin  0.30 mg/dL  N  0.2-1.0  

 

 Alkaline Phosphatase  80 U/L  N    

 

 Alt  22 U/L  N  7-52  

 

 Ast  17 U/L  N  13-39  

 

 Egfr Non-  118.7  N  >60  

 

 Egfr   152.7  N  >60  47









 Rapid Influenza A & B  2016  [facility]  Influenza A Molecular  NEGATIVE  N  
Negative  48



 Molecular              









 Influenza B Molecular  NEGATIVE  N  Negative  









 Laboratory test finding  2016  [facility]  Rapid Strep  Molecular  Negative  N  
Negative  49









 Throat Beta Strep Culture  SEE RESULT BELOW      50









 CBC Auto Diff  2016  [facility] [place]  White Blood Count  12.3 10^3/uL  High  3.5-
10.8  









 Red Blood Count  5.06 10^6/uL  N  4.0-5.4  

 

 Hemoglobin  14.5 g/dL  N  12.0-16.0  

 

 Hematocrit  43 %  N  35-47  

 

 Mean Corpuscular Volume  86 fL  N  80-97  

 

 Mean Corpuscular Hemoglobin  29 pg  N  27-31  

 

 Mean Corpuscular HGB Conc  34 g/dL  N  31-36  

 

 Red Cell Distribution Width  13 %  N  10.5-15  

 

 Platelet Count  218 10^3/uL  N  150-450  

 

 Mean Platelet Volume  9 um3  N  7.4-10.4  

 

 Abs Neutrophils  11.1 10^3/uL  High  1.5-7.7  

 

 Abs Lymphocytes  0.5 10^3/uL  Low  1.0-4.8  

 

 Abs Monocytes  0.6 10^3/uL  N  0-0.8  

 

 Abs Eosinophils  0.1 10^3/uL  N  0-0.6  

 

 Abs Basophils  0 10^3/uL  N  0-0.2  

 

 Abs Nucleated RBC  0 10^3/uL  N    

 

 Granulocyte %  89.8 %  High  38-83  

 

 Lymphocyte %  4.1 %  Low  25-47  

 

 Monocyte %  4.7 %  N  1-9  

 

 Eosinophil %  1.2 %  N  0-6  

 

 Basophil %  0.2 %  N  0-2  

 

 Nucleated Red Blood Cells %  0  N    









 CBC Auto Diff  2016  [facility] [place]  White Blood Count  6.1 10^3/uL  N  3.5-10.8  









 Red Blood Count  4.88 10^6/uL  N  4.0-5.4  

 

 Hemoglobin  14.0 g/dL  N  12.0-16.0  

 

 Hematocrit  43 %  N  35-47  

 

 Mean Corpuscular Volume  87 fL  N  80-97  

 

 Mean Corpuscular Hemoglobin  29 pg  N  27-31  

 

 Mean Corpuscular HGB Conc  33 g/dL  N  31-36  

 

 Red Cell Distribution Width  12 %  N  10.5-15  

 

 Platelet Count  340 10^3/uL  N  150-450  

 

 Mean Platelet Volume  8 um3  N  7.4-10.4  

 

 Abs Neutrophils  3.1 10^3/uL  N  1.5-7.7  

 

 Abs Lymphocytes  2.2 10^3/uL  N  1.0-4.8  

 

 Abs Monocytes  0.5 10^3/uL  N  0-0.8  

 

 Abs Eosinophils  0.1 10^3/uL  N  0-0.6  

 

 Abs Basophils  0.1 10^3/uL  N  0-0.2  

 

 Abs Nucleated RBC  0 10^3/uL  N    

 

 Granulocyte %  51.7 %  N  38-83  

 

 Lymphocyte %  36.7 %  N  25-47  

 

 Monocyte %  8.9 %  N  1-9  

 

 Eosinophil %  1.1 %  N  0-6  

 

 Basophil %  1.6 %  N  0-2  

 

 Nucleated Red Blood Cells %  0  N    









 Laboratory test finding  2016  [facility]  Magnesium  2.1 mg/dL  N  1.9-2.7  









 Troponin I  0.00 ng/mL  N  <0.03  51

 

 HCG Pregnancy  < 0.60 mIU/mL  N    52

 

 TSH (Thyroid Stim Horm)  1.22 ?IU/mL  N  0.34-5.60  









 Inr/Protime  2016  [facility]  Inr  0.95  N  0.89-1.11  

 

 Laboratory test finding  2016  [facility]  Partial Thrombo  28.9 seconds  N  
26.0-36.3  



       Time PTT        









 B Type Natriuretic Peptide  44 pg/mL  N    53









 Comp Metabolic Panel  2016  [facility] [place]  Sodium  137 mmol/L  N  133-145  









 Potassium  3.8 mmol/L  N  3.5-5.0  

 

 Chloride  105 mmol/L  N  101-111  

 

 Co2 Carbon Dioxide  24 mmol/L  N  22-32  

 

 Anion Gap  8 mmol/L  N  2-11  

 

 Glucose  106 mg/dL  High    

 

 Blood Urea Nitrogen  12 mg/dL  N  6-24  

 

 Creatinine  0.64 mg/dL  N  0.51-0.95  

 

 BUN/Creatinine Ratio  18.8  N  8-20  

 

 Calcium  9.2 mg/dL  N  8.6-10.3  

 

 Total Protein  7.0 g/dL  N  6.4-8.9  

 

 Albumin  4.4 g/dL  N  3.2-5.2  

 

 Globulin  2.6 g/dL  N  2-4  

 

 Albumin/Globulin Ratio  1.7  N  1-3  

 

 Total Bilirubin  0.20 mg/dL  N  0.2-1.0  

 

 Alkaline Phosphatase  72 U/L  N    

 

 Alt  23 U/L  N  7-52  

 

 Ast  14 U/L  N  13-39  









 CBC Auto Diff  2015  [facility] [place]  White Blood Count  8.6 10^3/uL  N  3.5-10.8  









 Red Blood Count  5.18 10^6/uL  N  4.0-5.4  

 

 Hemoglobin  14.8 g/dL  N  12.0-16.0  

 

 Hematocrit  46 %  N  35-47  

 

 Mean Corpuscular Volume  89 fL  N  80-97  

 

 Mean Corpuscular Hemoglobin  29 pg  N  27-31  

 

 Mean Corpuscular HGB Conc  32 g/dL  N  31-36  

 

 Red Cell Distribution Width  13 %  N  10.5-15  

 

 Platelet Count  275 10^3/uL  N  150-450  

 

 Mean Platelet Volume  9 um3  N  7.4-10.4  

 

 Abs Neutrophils  6.0 10^3/uL  N  1.5-7.7  

 

 Abs Lymphocytes  1.8 10^3/uL  N  1.0-4.8  

 

 Abs Monocytes  0.7 10^3/uL  N  0-0.8  

 

 Abs Eosinophils  0.1 10^3/uL  N  0-0.6  

 

 Abs Basophils  0 10^3/uL  N  0-0.2  

 

 Abs Nucleated RBC  0 10^3/uL  N    

 

 Granulocyte %  69.7 %  N  38-83  

 

 Lymphocyte %  20.4 %  Low  25-47  

 

 Monocyte %  8.1 %  N  1-9  

 

 Eosinophil %  1.4 %  N  0-6  

 

 Basophil %  0.4 %  N  0-2  

 

 Nucleated Red Blood Cells %  0.1  N    









 Comp Metabolic Panel  2015  [facility] [place]  Sodium  135 mmol/L  N  133-145  









 Potassium  3.8 mmol/L  N  3.5-5.0  

 

 Chloride  104 mmol/L  N  101-111  

 

 Co2 Carbon Dioxide  22 mmol/L  N  22-32  

 

 Anion Gap  9 mmol/L  N  2-11  

 

 Glucose  101 mg/dL  High    

 

 Blood Urea Nitrogen  7 mg/dL  N  6-24  

 

 Creatinine  0.57 mg/dL  N  0.51-0.95  

 

 BUN/Creatinine Ratio  12.3  N  8-20  

 

 Calcium  10.0 mg/dL  N  8.6-10.3  

 

 Total Protein  7.7 g/dL  N  6.4-8.9  

 

 Albumin  4.3 g/dL  N  3.2-5.2  

 

 Globulin  3.4 g/dL  N  2-4  

 

 Albumin/Globulin Ratio  1.3  N  1-3  

 

 Total Bilirubin  0.20 mg/dL  N  0.2-1.0  

 

 Alkaline Phosphatase  68 U/L  N    

 

 Alt  37 U/L  N  7-52  

 

 Ast  31 U/L  N  13-39  









 Laboratory test finding  2015  [facility]  Troponin I  0.89 ng/mL  High  <0.03  
54









 Lactic Acid  1.9 mmol/L  N  0.5-2.0  55

 

 Blood Culture  SEE RESULT BELOW      56









 Basic Metabolic Panel  2015  [facility] [place]  Sodium  137 mmol/L  N  133-145  









 Potassium  4.1 mmol/L  N  3.5-5.0  

 

 Chloride  104 mmol/L  N  101-111  

 

 Co2 Carbon Dioxide  26 mmol/L  N  22-32  

 

 Anion Gap  7 mmol/L  N  2-11  

 

 Glucose  99 mg/dL  N    

 

 Blood Urea Nitrogen  5 mg/dL  Low  6-24  

 

 Creatinine  0.72 mg/dL  N  0.51-0.95  

 

 BUN/Creatinine Ratio  6.9  Low  8-20  

 

 Calcium  9.3 mg/dL  N  8.6-10.3  









 Basic Metabolic Panel  2015  [facility] [place]  Sodium  137 mmol/L  N  133-145  









 Potassium  4.1 mmol/L  N  3.5-5.0  

 

 Chloride  104 mmol/L  N  101-111  

 

 Co2 Carbon Dioxide  26 mmol/L  N  22-32  

 

 Anion Gap  7 mmol/L  N  2-11  

 

 Glucose  99 mg/dL  N    

 

 Blood Urea Nitrogen  5 mg/dL  Low  6-24  

 

 Creatinine  0.72 mg/dL  N  0.51-0.95  

 

 BUN/Creatinine Ratio  6.9  Low  8-20  

 

 Calcium  9.3 mg/dL  N  8.6-10.3  









 Comp Metabolic-ALL Lab Compani  2015  [facility] [place]  Sodium  136 mmol/L  N  133-
145  









 Potassium  4.2 mmol/L  N  3.5-5.0  

 

 Chloride  103 mmol/L  N  101-111  

 

 Co2 Carbon Dioxide  27 mmol/L  N  22-32  

 

 Anion Gap  6 mmol/L  N  2-11  

 

 Glucose  100 mg/dL  N    

 

 Blood Urea Nitrogen  5 mg/dL  Low  6-24  

 

 Creatinine  0.69 mg/dL  N  0.51-0.95  

 

 BUN/Creatinine Ratio  7.2  Low  8-20  

 

 Calcium  9.5 mg/dL  N  8.6-10.3  

 

 Total Protein  6.9 g/dL  N  6.4-8.9  

 

 Albumin  4.3 g/dL  N  3.2-5.2  

 

 Globulin  2.6 g/dL  N  2-4  

 

 Albumin/Globulin Ratio  1.7  N  1-3  

 

 Total Bilirubin  0.20 mg/dL  N  0.2-1.0  

 

 Alkaline Phosphatase  73 U/L  N    

 

 Alt  20 U/L  N  7-52  

 

 Ast  18 U/L  N  13-39  









 Laboratory test finding  2015  [facility] [place]  Creatine Kinase  28 U/L  N    









 Vitamin B12  246 pg/mL  N  180-914  57

 

 Vitamin D Total 25(Oh)  26.0 ng/mL  Low  30-50  

 

 Camille (Antinuclear Antibodies)  Negative  N  Negative  









 CBC Auto Diff  2015  [facility] [place]  White Blood Count  4.8 10^3/uL  N  3.5-10.8  









 Red Blood Count  5.11 10^6/uL  N  4.0-5.4  

 

 Hemoglobin  14.9 g/dL  N  12.0-16.0  

 

 Hematocrit  46 %  N  35-47  

 

 Mean Corpuscular Volume  89 fL  N  80-97  

 

 Mean Corpuscular Hemoglobin  29 pg  N  27-31  

 

 Mean Corpuscular HGB Conc  33 g/dL  N  31-36  

 

 Red Cell Distribution Width  12 %  N  10.5-15  

 

 Platelet Count  206 10^3/uL  N  150-450  

 

 Mean Platelet Volume  9 um3  N  7.4-10.4  

 

 Abs Neutrophils  2.8 10^3/uL  N  1.5-7.7  

 

 Abs Lymphocytes  1.4 10^3/uL  N  1.0-4.8  

 

 Abs Monocytes  0.4 10^3/uL  N  0-0.8  

 

 Abs Eosinophils  0.1 10^3/uL  N  0-0.6  

 

 Abs Basophils  0 10^3/uL  N  0-0.2  

 

 Abs Nucleated RBC  0 10^3/uL  N    

 

 Granulocyte %  59.7 %  N  38-83  

 

 Lymphocyte %  29.1 %  N  25-47  

 

 Monocyte %  8.8 %  N  1-9  

 

 Eosinophil %  2.0 %  N  0-6  

 

 Basophil %  0.4 %  N  0-2  

 

 Nucleated Red Blood Cells %  0  N    









 CBC Electronic-ALL Lab  2015  [facility] [place]  White Blood Count  4.8 10^3/uL  N  3.5
-10.8  



 [facility]              









 Red Blood Count  5.11 10^6/uL  N  4.0-5.4  

 

 Hemoglobin  14.9 g/dL  N  12.0-16.0  

 

 Hematocrit  46 %  N  35-47  

 

 Mean Corpuscular Volume  89 fL  N  80-97  

 

 Mean Corpuscular Hemoglobin  29 pg  N  27-31  

 

 Mean Corpuscular HGB Conc  33 g/dL  N  31-36  

 

 Red Cell Distribution Width  12 %  N  10.5-15  

 

 Platelet Count  206 10^3/uL  N  150-450  

 

 Mean Platelet Volume  9 um3  N  7.4-10.4  

 

 Abs Neutrophils  2.8 10^3/uL  N  1.5-7.7  

 

 Abs Lymphocytes  1.4 10^3/uL  N  1.0-4.8  

 

 Abs Monocytes  0.4 10^3/uL  N  0-0.8  

 

 Abs Eosinophils  0.1 10^3/uL  N  0-0.6  

 

 Abs Basophils  0 10^3/uL  N  0-0.2  

 

 Abs Nucleated RBC  0 10^3/uL  N    

 

 Granulocyte %  59.7 %  N  38-83  

 

 Lymphocyte %  29.1 %  N  25-47  

 

 Monocyte %  8.8 %  N  1-9  

 

 Eosinophil %  2.0 %  N  0-6  

 

 Basophil %  0.4 %  N  0-2  

 

 Nucleated Red Blood Cells %  0  N    









 Laboratory test finding  2015  [facility]  Free T4 (Free  0.88 ng/mL  N  0.61-
1.12  



       Thyroxine)        









 TSH (Thyroid Stim Horm)  1.45 ?IU/mL  N  0.34-5.60  

 

 C Reactive Protein  25.62 mg/L  High  < 5.00  58









 CBC Auto Diff  10/27/2015  [facility] [place]  White Blood Count  6.7 10^3/uL  N  4.8-10.8  









 Red Blood Count  4.62 10^6/uL  N  4.0-5.4  

 

 Hemoglobin  14.0 g/dL  N  12.0-16.0  

 

 Hematocrit  42 %  N  35-47  

 

 Mean Corpuscular Volume  91 fL  N  80-97  

 

 Mean Corpuscular Hemoglobin  30 pg  N  27-31  

 

 Mean Corpuscular HGB Conc  33 g/dL  N  31-36  

 

 Red Cell Distribution Width  13 %  N  10.5-15  

 

 Platelet Count  223 10^3/uL  N  150-450  

 

 Mean Platelet Volume  9 um3  N  7.4-10.4  

 

 Abs Neutrophils  5.0 10^3/uL  N  1.5-7.7  

 

 Abs Lymphocytes  1.2 10^3/uL  N  1.0-4.8  

 

 Abs Monocytes  0.4 10^3/uL  N  0-0.8  

 

 Abs Eosinophils  0.1 10^3/uL  N  0-0.6  

 

 Abs Basophils  0 10^3/uL  N  0-0.2  

 

 Abs Nucleated RBC  0 10^3/uL  N    

 

 Granulocyte %  74.8 %  N  38-83  

 

 Lymphocyte %  17.7 %  Low  25-47  

 

 Monocyte %  5.5 %  N  1-9  

 

 Eosinophil %  1.5 %  N  0-6  

 

 Basophil %  0.5 %  N  0-2  

 

 Nucleated Red Blood Cells %  0.1  N    









 Comp Metabolic Panel  10/27/2015  [facility]  Sodium  136 mmol/L  N  133-145  









 Potassium  3.6 mmol/L  N  3.5-5.0  

 

 Chloride  104 mmol/L  N  101-111  

 

 Co2 Carbon Dioxide  27 mmol/L  N  22-32  

 

 Anion Gap  5 mmol/L  N  2-11  

 

 Glucose  96 mg/dL  N    

 

 Blood Urea Nitrogen  8 mg/dL  N  6-24  

 

 Creatinine  0.64 mg/dL  N  0.51-0.95  

 

 BUN/Creatinine Ratio  12.5  N  8-20  

 

 Calcium  9.7 mg/dL  N  8.6-10.3  

 

 Total Protein  7.0 g/dL  N  6.4-8.9  

 

 Albumin  4.3 g/dL  N  3.2-5.2  

 

 Globulin  2.7 g/dL  N  2-4  

 

 Albumin/Globulin Ratio  1.6  N  1-3  

 

 Total Bilirubin  0.30 mg/dL  N  0.2-1.0  

 

 Alkaline Phosphatase  53 U/L  N    

 

 Alt  14 U/L  N  7-52  

 

 Ast  12 U/L  Low  13-39  









 Laboratory test finding  10/27/2015  [facility]  Magnesium  2.1 mg/dL  N  1.9-2.7  









 Serum Pregnancy  Negative  N  Negative  









 CBC Auto Diff  2015  [facility] [place]  White Blood Count  6.1 10^3/uL  N  4.8-10.8  









 Red Blood Count  4.34 10^6/uL  N  4.0-5.4  

 

 Hemoglobin  13.0 g/dL  N  12.0-16.0  

 

 Hematocrit  39 %  N  35-47  

 

 Mean Corpuscular Volume  90 fL  N  80-97  

 

 Mean Corpuscular Hemoglobin  30 pg  N  27-31  

 

 Mean Corpuscular HGB Conc  33 g/dL  N  31-36  

 

 Red Cell Distribution Width  12 %  N  10.5-15  

 

 Platelet Count  219 10^3/uL  N  150-450  

 

 Mean Platelet Volume  8 um3  N  7.4-10.4  

 

 Abs Neutrophils  3.3 10^3/uL  N  1.5-7.7  

 

 Abs Lymphocytes  2.2 10^3/uL  N  1.0-4.8  

 

 Abs Monocytes  0.3 10^3/uL  N  0-0.8  

 

 Abs Eosinophils  0.2 10^3/uL  N  0-0.6  

 

 Abs Basophils  0.1 10^3/uL  N  0-0.2  

 

 Abs Nucleated RBC  0 10^3/uL  N    

 

 Granulocyte %  54.6 %  N  38-83  

 

 Lymphocyte %  35.5 %  N  25-47  

 

 Monocyte %  5.8 %  N  1-9  

 

 Eosinophil %  3.2 %  N  0-6  

 

 Basophil %  0.9 %  N  0-2  

 

 Nucleated Red Blood Cells %  0  N    









 Laboratory test finding  2015  [facility]  Lactic Acid  0.9 mmol/L  N  0.5-2.2  

 

 Comp Metabolic Panel  2015  [facility] [place]  Sodium  135 mmol/L  N  133-145  









 Potassium  3.5 mmol/L  N  3.5-5.0  

 

 Chloride  105 mmol/L  N  101-111  

 

 Co2 Carbon Dioxide  26 mmol/L  N  22-32  

 

 Anion Gap  4 mmol/L  N  2-11  

 

 Glucose  104 mg/dL  High    

 

 Blood Urea Nitrogen  20 mg/dL  N  6-24  

 

 Creatinine  0.59 mg/dL  N  0.51-0.95  

 

 BUN/Creatinine Ratio  33.9  High  8-20  

 

 Calcium  9.2 mg/dL  N  8.6-10.3  

 

 Total Protein  6.2 g/dL  Low  6.4-8.9  

 

 Albumin  3.9 g/dL  N  3.2-5.2  

 

 Globulin  2.3 g/dL  N  2-4  

 

 Albumin/Globulin Ratio  1.7  N  1-3  

 

 Total Bilirubin  0.30 mg/dL  N  0.2-1.0  

 

 Alkaline Phosphatase  57 U/L  N    

 

 Alt  16 U/L  N  7-52  

 

 Ast  14 U/L  N  13-39  









 Inr/Protime  2015  [facility] [place]  Inr  1.07  N  0.78-1.07  

 

 Laboratory test finding  2015  [facility] [place]  Partial Thrombo  32.8 seconds  N  
26.0-36.3  



       Time PTT        









 Troponin I  0.00 ng/mL  N  <0.03  59

 

 Blood Culture  SEE RESULT BELOW      60









 Laboratory test finding  09/10/2015  [facility] [place]  Urine Pregnancy  Negative  N  
Negative  61

 

 Urinalysis Profile  2015  [facility] [place]  Urine Color  Yellow  N    









 Urine Appearance  Cloudy  N    

 

 Urine Specific Gravity  1.034  High  1.010-1.030  

 

 Urine pH  5.0  N  5-9  

 

 Urine Urobilinogen  Negative  N  Negative  

 

 Urine Ketones  Negative  N  Negative  

 

 Urine Protein  1+(30 mg/dL)  Abnormal  Negative  

 

 Urine Leukocytes  Negative  N  Negative  

 

 Urine Blood  Negative  N  Negative  

 

 *  *  Abnormal  Negative  62

 

 Urine Nitrite  Negative  N  Negative  

 

 Urine Bilirubin  1+  Abnormal  Negative  

 

 Urine Glucose  Negative  N  Negative  

 

 Urine White Blood Cell  Absent  N  Absent  

 

 Urine Red Blood Cell  Absent  N  Absent  

 

 Urine Bacteria  Absent  N  Absent  

 

 Urine Squamous Epithelial Cell  Present  Abnormal  Absent  









 Laboratory test  2015  [facility] [place]  Wound Culture/Sensi  SEE RESULT BELOW      63
, 64



 finding              









 Anaerobic Culture  SEE RESULT BELOW      65









 Laboratory test  2015  [facility] [place]  Blood Culture  SEE RESULT BELOW      66, 67



 finding              

 

 Comp Metabolic-ALL Lab  2015  [facility] [place]  Sodium  135 mmol/L  N  133-145  



 [facility]              









 Potassium  4.1 mmol/L  N  3.5-5.0  

 

 Chloride  105 mmol/L  N  101-111  

 

 Co2 Carbon Dioxide  24 mmol/L  N  22-32  

 

 Anion Gap  6 mmol/L  N  2-11  

 

 Glucose  75 mg/dL  N    

 

 Blood Urea Nitrogen  16 mg/dL  N  6-24  

 

 Creatinine  0.58 mg/dL  N  0.51-0.95  

 

 BUN/Creatinine Ratio  27.6  High  8-20  

 

 Calcium  9.3 mg/dL  N  8.6-10.3  

 

 Total Protein  6.8 g/dL  N  6.4-8.9  

 

 Albumin  4.3 g/dL  N  3.2-5.2  

 

 Globulin  2.5 g/dL  N  2-4  

 

 Albumin/Globulin Ratio  1.7  N  1-3  

 

 Total Bilirubin  0.30 mg/dL  N  0.2-1.0  

 

 Alkaline Phosphatase  59 U/L  N    

 

 Alt  14 U/L  N  7-52  

 

 Ast  14 U/L  N  13-39  









 Laboratory test finding  2015  [facility] [place]  Blood Culture  SEE RESULT BELOW      
68









 C Reactive Protein  < 1.00 mg/L  N  < 5.00  69









 CBC Auto Diff  2015  [facility] [place]  White Blood Count  6.7 10^3/uL  N  4.8-10.8  









 Red Blood Count  4.60 10^6/uL  N  4.0-5.4  

 

 Hemoglobin  13.6 g/dL  N  12.0-16.0  

 

 Hematocrit  42 %  N  35-47  

 

 Mean Corpuscular Volume  91 fL  N  80-97  

 

 Mean Corpuscular Hemoglobin  30 pg  N  27-31  

 

 Mean Corpuscular HGB Conc  32 g/dL  N  31-36  

 

 Red Cell Distribution Width  13 %  N  10.5-15  

 

 Platelet Count  251 10^3/uL  N  150-450  

 

 Mean Platelet Volume  9 um3  N  7.4-10.4  

 

 Abs Neutrophils  4.6 10^3/uL  N  1.5-7.7  

 

 Abs Lymphocytes  1.6 10^3/uL  N  1.0-4.8  

 

 Abs Monocytes  0.4 10^3/uL  N  0-0.8  

 

 Abs Eosinophils  0.2 10^3/uL  N  0-0.6  

 

 Abs Basophils  0 10^3/uL  N  0-0.2  

 

 Abs Nucleated RBC  0 10^3/uL  N    

 

 Granulocyte %  67.6 %  N  38-83  

 

 Lymphocyte %  23.8 %  Low  25-47  

 

 Monocyte %  5.6 %  N  1-9  

 

 Eosinophil %  2.6 %  N  0-6  

 

 Basophil %  0.4 %  N  0-2  

 

 Nucleated Red Blood Cells %  0  N    









 Laboratory test  2015  [facility] [place]  Erythrocyte Sed  4 mm/Hr  N  0-14  



 finding      Rate        

 

 Urine Culture  2015  [facility]  Urine Culture,  Final report      70, 71



 Routine    [address] [facility]  Routine        



     [place] [address]          



     (607)-   -          









 Result 1  No growth      72









 Urine Pregnancy ([facility])  2015  [facility]  SP Grav  1.015      



     (607)-   -          









 Urine, Pregnancy ([facility])  NEGATIVE      









 Laboratory test finding  2015  [facility]  Magnesium  1.9 mg/dL  N  1.9-2.7  









 Lipase  7 U/L  Low  11.0-82.0  

 

 C Reactive Protein  < 1.00 mg/L  N  < 5.00  73

 

 Troponin I  0.00 ng/mL  N  <0.03  74

 

 TSH (Thyroid Stim Horm)  0.60 ?IU/mL  N  0.34-5.60  









 Urinalysis Profile  2015  [facility] [place]  Urine Color  Yellow  N    









 Urine Appearance  Clear  N    

 

 Urine Specific Gravity  1.013  N  1.010-1.030  

 

 Urine pH  7.0  N  5-9  

 

 Urine Urobilinogen  Negative  N  Negative  

 

 Urine Ketones  1+  Abnormal  Negative  

 

 Urine Protein  Negative  N  Negative  

 

 Urine Leukocytes  Negative  N  Negative  

 

 Urine Blood  Negative  N  Negative  

 

 Urine Nitrite  Negative  N  Negative  

 

 Urine Bilirubin  Negative  N  Negative  

 

 Urine Glucose  Negative  N  Negative  









 CBC Auto Diff  2015  [facility] [place]  White Blood Count  6.5 10^3/uL  N  4.8-10.8  









 Red Blood Count  4.74 10^6/uL  N  4.0-5.4  

 

 Hemoglobin  13.8 g/dL  N  12.0-16.0  

 

 Hematocrit  42 %  N  35-47  

 

 Mean Corpuscular Volume  88 fL  N  80-97  

 

 Mean Corpuscular Hemoglobin  29 pg  N  27-31  

 

 Mean Corpuscular HGB Conc  33 g/dL  N  31-36  

 

 Red Cell Distribution Width  12 %  N  10.5-15  

 

 Platelet Count  212 10^3/uL  N  150-450  

 

 Mean Platelet Volume  10 um3  N  7.4-10.4  

 

 Abs Neutrophils  4.4 10^3/uL  N  1.5-7.7  

 

 Abs Lymphocytes  1.7 10^3/uL  N  1.0-4.8  

 

 Abs Monocytes  0.2 10^3/uL  N  0-0.8  

 

 Abs Eosinophils  0 10^3/uL  N  0-0.6  

 

 Abs Basophils  0 10^3/uL  N  0-0.2  

 

 Abs Nucleated RBC  0 10^3/uL  N    

 

 Granulocyte %  67.9 %  N  38-83  

 

 Lymphocyte %  26.8 %  N  25-47  

 

 Monocyte %  3.8 %  N  1-9  

 

 Eosinophil %  0.7 %  N  0-6  

 

 Basophil %  0.8 %  N  0-2  

 

 Nucleated Red Blood Cells %  0  N    









 Laboratory test finding  2015  [facility]  Lactic Acid  1.6 mmol/L  N  0.5-2.2  









 B Type Natriuretic Peptide  26 pg/mL  N    75









 Inr/Protime  2015  [facility]  Inr  1.04  N  0.78-1.07  

 

 Laboratory test finding  2015  [facility]  Activated Partial  31.4 seconds  N  
26.0-36.3  



       Thrombo Time        

 

 Comp Metabolic Panel  2015  [facility]  Sodium  135 mmol/L  N  133-145  









 Potassium  3.5 mmol/L  N  3.5-5.0  

 

 Chloride  105 mmol/L  N  101-111  

 

 Co2 Carbon Dioxide  22 mmol/L  N  22-32  

 

 Anion Gap  8 mmol/L  N  2-11  

 

 Glucose  126 mg/dL  High    

 

 Blood Urea Nitrogen  21 mg/dL  N  6-24  

 

 Creatinine  0.69 mg/dL  N  0.51-0.95  

 

 BUN/Creatinine Ratio  30.4  High  8-20  

 

 Calcium  9.5 mg/dL  N  8.6-10.3  

 

 Total Protein  6.6 g/dL  N  6.4-8.9  

 

 Albumin  4.1 g/dL  N  3.2-5.2  

 

 Globulin  2.5 g/dL  N  2-4  

 

 Albumin/Globulin Ratio  1.6  N  1-3  

 

 Total Bilirubin  0.60 mg/dL  N  0.2-1.0  

 

 Alkaline Phosphatase  76 U/L  N    

 

 Alt  22 U/L  N  7-52  

 

 Ast  14 U/L  N  13-39  









 Babesia Microti  2015  [facility]  Babesia microti  <1:10    Neg:<1:10  



 AB PN    [address] [place] [address] [phone]          









 Babesia microti IgG  <1:10    Neg:<1:10  76









 Ehrlichia AB  2015  [facility]  E. chaffeensis  Negative    Neg:<1:64  



 Panel (CTX)    [address]  ([facility]) IgG Titer        



     [place] [address] [phone]          









 E. chaffeensis ([facility]) IgM Titer  Negative    Neg:<1:20  77

 

 Hge IgG Titer  Negative    Neg:<1:64  78

 

 Hge IgM Titer  Negative    Neg:<1:20  79









 Bartonella AB  2015  [facility]  B. henselae  Negative    Neg:<1:320  



 Panel    [address]  IgG  titer      



     [place] [address] [phone]          









 B. henselae IgM  Negative titer    Neg:<1:100  

 

 B. rodriguez IgG  Negative titer    Neg:<1:320  

 

 B. rodriguez IgM  Negative titer    Neg:<1:100  80









 Laboratory test finding  2015  [facility] [place]  Troponin I  0.00 ng/mL  N  <0.03  81

 

 CBC Auto Diff  2015  [facility] [place]  White Blood Count  4.6 10^3/uL  Low  4.8-10.8  
82









 Red Blood Count  4.26 10^6/uL  N  4.0-5.4  

 

 Hemoglobin  12.9 g/dL  N  12.0-16.0  

 

 Hematocrit  38 %  N  35-47  

 

 Mean Corpuscular Volume  88 fL  N  80-97  

 

 Mean Corpuscular Hemoglobin  30 pg  N  27-31  

 

 Mean Corpuscular HGB Conc  34 g/dL  N  31-36  

 

 Red Cell Distribution Width  14 %  N  10.5-15  

 

 Platelet Count  217 10^3/uL  N  150-450  

 

 Mean Platelet Volume  9 um3  N  7.4-10.4  

 

 Abs Neutrophils  1.5 10^3/uL  N  1.5-7.7  

 

 Abs Lymphocytes  2.5 10^3/uL  N  1.0-4.8  

 

 Abs Monocytes  0.5 10^3/uL  N  0-0.8  

 

 Abs Eosinophils  0.1 10^3/uL  N  0-0.6  

 

 Abs Basophils  0 10^3/uL  N  0-0.2  

 

 Abs Nucleated RBC  0 10^3/uL  N    

 

 Granulocyte %  32.4 %  Low  38-83  

 

 Lymphocyte %  53.5 %  High  25-47  

 

 Monocyte %  10.8 %  High  1-9  

 

 Eosinophil %  2.5 %  N  0-6  

 

 Basophil %  0.8 %  N  0-2  

 

 Nucleated Red Blood Cells %  0.1  N    









 Laboratory test finding  2015  [facility]  Erythrocyte Sed Rate  8 mm/Hr  N  0-
14  83

 

 Comp Metabolic Panel  2015  [facility] [place]  Sodium  137 mmol/L  N  133-145  









 Potassium  3.6 mmol/L  N  3.5-5.0  

 

 Chloride  106 mmol/L  N  101-111  

 

 Co2 Carbon Dioxide  23 mmol/L  N  22-32  

 

 Anion Gap  8 mmol/L  N  2-11  

 

 Glucose  74 mg/dL  N    

 

 Blood Urea Nitrogen  20 mg/dL  N  6-24  

 

 Creatinine  0.74 mg/dL  N  0.51-0.95  

 

 BUN/Creatinine Ratio  27.0  High  8-20  

 

 Calcium  9.7 mg/dL  N  8.6-10.3  

 

 Total Protein  7.2 g/dL  N  6.4-8.9  

 

 Albumin  4.5 g/dL  N  3.2-5.2  

 

 Globulin  2.7 g/dL  N  2-4  

 

 Albumin/Globulin Ratio  1.7  N  1-3  

 

 Total Bilirubin  0.20 mg/dL  N  0.2-1.0  

 

 Alkaline Phosphatase  73 U/L  N    

 

 Alt  28 U/L  N  7-52  

 

 Ast  21 U/L  N  13-39  









 Laboratory test finding  2015  [facility]  Magnesium  2.0 mg/dL  N  1.9-2.7  84









 Troponin I  0.00 ng/mL  N  <0.03  85









 Laboratory test finding  2015  [facility]  Lactic Acid  0.8 mmol/L  N  0.5-2.2  

 

 Comp Metabolic Panel  2015  [facility] [place]  Sodium  136 mmol/L  N  133-145  









 Potassium  4.3 mmol/L  N  3.5-5.0  

 

 Chloride  107 mmol/L  N  101-111  

 

 Co2 Carbon Dioxide  24 mmol/L  N  22-32  

 

 Anion Gap  5 mmol/L  N  2-11  

 

 Glucose  79 mg/dL  N    

 

 Blood Urea Nitrogen  15 mg/dL  N  6-24  

 

 Creatinine  0.65 mg/dL  N  0.51-0.95  

 

 BUN/Creatinine Ratio  23.1  High  8-20  

 

 Calcium  9.3 mg/dL  N  8.6-10.3  

 

 Total Protein  6.9 g/dL  N  6.4-8.9  

 

 Albumin  4.2 g/dL  N  3.2-5.2  

 

 Globulin  2.7 g/dL  N  2-4  

 

 Albumin/Globulin Ratio  1.6  N  1-3  

 

 Total Bilirubin  0.30 mg/dL  N  0.2-1.0  

 

 Alkaline Phosphatase  63 U/L  N    

 

 Alt  21 U/L  N  7-52  

 

 Ast  17 U/L  N  13-39  









 Laboratory test finding  2015  [facility]  C Reactive Protein  < 1.00 mg/L  N  
< 5.00  86









 Erythrocyte Sed Rate  10 mm/Hr  N  0-14  









 CBC No Diff  2015  [facility]  White Blood Count  5.0 10^3/uL  N  4.8-10.8  









 Red Blood Count  4.79 10^6/uL  N  4.0-5.4  

 

 Hemoglobin  13.9 g/dL  N  12.0-16.0  

 

 Hematocrit  42 %  N  35-47  

 

 Mean Corpuscular Volume  89 fL  N  80-97  

 

 Mean Corpuscular Hemoglobin  29 pg  N  27-31  

 

 Mean Corpuscular HGB Conc  33 g/dL  N  31-36  

 

 Red Cell Distribution Width  14 %  N  10.5-15  

 

 Platelet Count  234 10^3/uL  N  150-450  

 

 Mean Platelet Volume  10 um3  N  7.4-10.4  









 Laboratory test  2015  [facility]  Lyme Disease  Negative  N  Negative  87



 finding      Serology        

 

 Laboratory test  2015  [facility]  [facility] Lab Test  see scanned   
   



 finding              









 1  [id number]

 

 2  SEE RESULT BELOW



   -----------------------------------------------------------------------------
---------------



   Name:  BEBA ORLANDO               : 2000    Attend Dr: Fortino Garcia MD



   Acct:  [id number]  Unit: [id number]  AGE: 19            Location:  [facility]



   Re19                        SEX: F             Status:    DEP REF



   -----------------------------------------------------------------------------
---------------



   



   SPEC: [id number]         VOLODYMYR:       [facility] DR: Fortino Garcia MD



   REQ:  [id number]              RECD:   [id number]



   STATUS: ARIANA ONEIL DR: Tristan Delgado MD



   _



   SOURCE: GAS ANTRUM     SPDESC:



   ORDERED:  Clotest



   COMMENTS: [id number]



   



   -----------------------------------------------------------------------------
---------------



   Procedure                         Result                         Reported   
        Site



   -----------------------------------------------------------------------------
---------------



   Clotest  Final                                                   02/15/19-
744      ML



   Clotest                     Negative



   



   -----------------------------------------------------------------------------
---------------



   * ML - Main Lab



   .



   



   



   



   



   



   



   



   



   



   



   



   



   



   



   



   



   



   



   



   



   



   



   



   



   



   



   ** END OF REPORT **



   



   DEPARTMENT OF PATHOLOGY,  [address] [place] [address]



   Phone # [phone]      Fax #[phone]



   Darrell Dunlap M.D. Director     [facility] # [id number]

 

 3  [id number]

 

 4  SEE RESULT BELOW



   -----------------------------------------------------------------------------
---------------



   Name:  BEBA ORLANDO               : 2000    Attend Dr: Fortino Garcia MD



   Acct:  [id number]  Unit: [id number]  AGE: 19            Location:  [facility]



   Re19                        SEX: F             Status:    DEP REF



   -----------------------------------------------------------------------------
---------------



   



   SPEC: [id number]             VOLODYMYR:       [facility] DR: Fortino Garcia MD



   REQ:  [id number]             RECD: [id number]



   STATUS: JUDITH ONEIL DR: Tristan Delgado MD



   _



   ORDERED:  LEVEL 4



   COMMENTS: [id number]



   



   FINAL DIAGNOSIS



   



   



   Esophagus, biopsy:



   -- Benign squamous mucosa with mild erosive changes.



   -- No evidence of eosinophilic esophagitis.



   -- No columnar component present for evaluation.



   



   



   -----------------------------------------------------------------------------
---------------



   



   



   



   CLINICAL HISTORY



   



   Screening/Surveillance for malignancy in asymptomatic patient; dysphagia



   



   



   POST-OPERATIVE DIAGNOSIS



   



   EGD: esophagus- normal, biopsy; gastric - normal, biopsy; duodenum - normal



   



   



   GROSS DESCRIPTION



   



   The specimen is received in formalin labeled, Biopsy Esophagus, and consists 
of a 0.5 x 0.4



   x 0.1 cm aggregate of translucent tan-white irregular soft tissue fragments 
which is



   submitted entirely in one cassette.



   



   Signed by and Reported on: __________              Jo Collins MD 
02/15/19 5536



   



   -----------------------------------------------------------------------------
---------------



   



   



   



   



   ** END OF REPORT **



   



   DEPARTMENT OF PATHOLOGY,  [address] [place] [address]



   Phone # [phone]      Fax #[phone]



   Darrell Dunlap M.D. Director     [facility] # [id number]

 

 5  [id number]

 

 6  *******Because ethnic data is not always readily available,



   this report includes an eGFR for both -Americans and



   non- Americans.****



   The National Kidney Disease Education Program (NKDEP) does



   not endorse the use of the MDRD equation for patients that



   are not between the ages of 18 and 70, are pregnant, have



   extremes of body size, muscle mass, or nutritional status,



   or are non- or non-.



   According to the National Kidney Foundation, irrespective of



   diagnosis, the stage of the disease is based on the level of



   kidney function:



   Stage Description                      GFR(mL/min/1.73 m(2))



   1     Kidney damage with normal or decreased GFR       90



   2     Kidney damage with mild decrease in GFR          60-89



   3     Moderate decrease in GFR                         30-59



   4     Severe decrease in GFR                           15-29



   5     Kidney failure                       <15 (or dialysis)

 

 7  [id number]

 

 8  <5.0 Negative



   



   5.0 - 25.0  Indeterminate (Repeat testing recommended after



   72 hours)



   >25.0  Positive



   



   Perimenopausal women can display HCG levels of up to 20



   mIU/mL

 

 9  [id number]

 

 10  : [id number]



   If pregnancy is still suspected, please repeat test after



   48 to 72 hours.

 

 11  Test Performed by:



   [address] [address]

 

 12  SEE RESULT BELOW



   -----------------------------------------------------------------------------
---------------



   Name:  BEBA ORLANDO               : 2000    Attend Dr: Gemma Lazaro NP



   Acct:  [id number]  Unit: [id number]  AGE: 18            Location:  [facility]



   Re18                        SEX: F             Status:    REG REF



   -----------------------------------------------------------------------------
---------------



   



   SPEC: [id number]         VOLODYMYR:       [facility] DR: Gemma Lazaro NP



   REQ:  [id number]              RECD:   



   STATUS: COMP



   _



   SOURCE: URINE          SPDESC:



   ORDERED:  Urine Culture



   COMMENTS: 1 URINE GREY TUBE



   [id number]



   



   -----------------------------------------------------------------------------
---------------



   Procedure                         Result                         Reported   
        Site



   -----------------------------------------------------------------------------
---------------



   Urine Culture  Final                                             03/10/18-
922      ML



   



   Organism 1                     ESCHERICHIA COLI



   [place] Count                10-25,000 (Moderate) CFU/ML



   



   1. ESCHERICHIA COLI



   M.I.C.      RX



   --------- ------



   Ampicillin                     4           S



   Cefazolin                      <=4         S



   Cefepime                       <=1         S



   Ceftriaxone                    <=1         S



   Ciprofloxacin                  <=0.25      S



   Gentamicin                     <=1         S



   Levofloxacin                   <=0.12      S



   Meropenem                      <=0.25      S



   Nitrofurantoin                 <=16        S



   Tetracycline                   <=1         S



   Pipercillin/Tazobactam         <=4         S



   Trimethoprim/Sulfamethoxazole  <=20        S



   Amoxicillin/Clavulanic Acid    <=2         S



   Aztreonam                      <=1         S



   



   



   Contact the Microbiology Department for any additional



   antibiotic reporting.



   



   -----------------------------------------------------------------------------
---------------



   * ML - Main Lab



   .



   



   ** END OF REPORT **



   



   DEPARTMENT OF PATHOLOGY,  [address] [place] [address]



   Phone # [phone]      Fax #[phone]



   Darrell Dunlap M.D. Director     [facility] # [id number]

 

 13  1sst

 

 14  Positive: 5 of the following



   Borrelia-specific bands:



   18,23,28,30,39,41,45,58,



   66, and 93.



   Negative: No bands or banding



   patterns which do not



   meet positive criteria.

 

 15  Note: An equivocal or positive EIA result followed by a negative



   Western Blot result is considered NEGATIVE. An equivocal or positive



   EIA result followed by a positive Western Blot is considered POSITIVE



   by the CDC.



   Positive: 2 of the following bands: 23,39 or 41



   Negative: No bands or banding patterns which do not meet positive



   criteria.



   Criteria for positivity are those recommended by CDC/ASTPHLD.



   p23=Osp C, n41=ooroyyowq



   Note:



   Sera from individuals with the following may cross react in the



   Lyme Western Blot assays: other spirochetal diseases (periodontal



   disease, leptospirosis, relapsing fever, yaws, and pinta);



   connective autoimmune (Rheumatoid Arthritis and Systemic Lupus



   Erythematosus and also individuals with Antinuclear Antibody);



   other infections ([place] Spotted Fever; Nabeel-Barr Virus,



   and Cytomegalovirus).

 

 16  [facility] Severe Sepsis and Septic Shock Management Bundle Measure



   requires all lactic acids initially measuring >2.0 mmol/L be



   repeated.

 

 17  99th percentile=0.04 ng/mL



   



   Troponin results at [facility] and [facility] are not interchangeable.

 

 18  *******Because ethnic data is not always readily available,



   this report includes an eGFR for both -Americans and



   non- Americans.****



   The National Kidney Disease Education Program (NKDEP) does



   not endorse the use of the MDRD equation for patients that



   are not between the ages of 18 and 70, are pregnant, have



   extremes of body size, muscle mass, or nutritional status,



   or are non- or non-.



   According to the National Kidney Foundation, irrespective of



   diagnosis, the stage of the disease is based on the level of



   kidney function:



   Stage Description                      GFR(mL/min/1.73 m(2))



   1     Kidney damage with normal or decreased GFR       90



   2     Kidney damage with mild decrease in GFR          60-89



   3     Moderate decrease in GFR                         30-59



   4     Severe decrease in GFR                           15-29



   5     Kidney failure                       <15 (or dialysis)

 

 19  <5.0 Negative



   



   5.0 - 25.0  Indeterminate (Repeat testing recommended after



   72 hours)



   >25.0  Positive



   



   Perimenopausal women can display HCG levels of up to 20



   mIU/mL

 

 20  *******Because ethnic data is not always readily available,



   this report includes an eGFR for both -Americans and



   non- Americans.****



   The National Kidney Disease Education Program (NKDEP) does



   not endorse the use of the MDRD equation for patients that



   are not between the ages of 18 and 70, are pregnant, have



   extremes of body size, muscle mass, or nutritional status,



   or are non- or non-.



   According to the National Kidney Foundation, irrespective of



   diagnosis, the stage of the disease is based on the level of



   kidney function:



   Stage Description                      GFR(mL/min/1.73 m(2))



   1     Kidney damage with normal or decreased GFR       90



   2     Kidney damage with mild decrease in GFR          60-89



   3     Moderate decrease in GFR                         30-59



   4     Severe decrease in GFR                           15-29



   5     Kidney failure                       <15 (or dialysis)

 

 21  2 sst

 

 22  Negative:     < 0.8



   Indeterminate: 0.8 - 0.9



   Positive:     > 0.9



   The CDC recommends that a positive HCV antibody result



   be followed up with a HCV Nucleic Acid Amplification



   test (098414).

 

 23  SEE RESULT BELOW



   -----------------------------------------------------------------------------
---------------



   Name:  BEBA ORLANDO               : 2000    Attend Dr: Kalia Craig MD



   Acct:  [id number]  Unit: [id number]  AGE: 16            Location:  [facility]



   Re16                        SEX: F             Status:    DEP ER



   -----------------------------------------------------------------------------
---------------



   



   SPEC: [id number]         VOLODYMYR:       [facility] DR: Kalia Craig MD



   REQ:  [id number]              RECD:   



   STATUS: ARIANA ONE[facility] DR: Tristan Delgado MD



   _



   SOURCE: URINE          SPDESC:



   ORDERED:  Urine Culture



   



   -----------------------------------------------------------------------------
---------------



   Procedure                         Result                         Reported   
        Site



   -----------------------------------------------------------------------------
---------------



   Urine Culture  Final                                             16-
0752      ML



   



   No growth of clinically significant organisms



   



   -----------------------------------------------------------------------------
---------------



   * ML - MAIN LAB (PSC1)



   .



   



   



   



   



   



   



   



   



   



   



   



   



   



   



   



   



   



   



   



   



   



   



   



   



   



   



   ** END OF REPORT **



   



   * ML=Testing performed at Main Lab



   DEPARTMENT OF PATHOLOGY,  [address] [place] [address]



   Phone # [phone]      Fax #[phone]



   Darrell Dunlap M.D. Director     [facility] # [id number]

 

 24  **Please note:



   The following may produce a false positive D Dimer test:



   - Rheumatoid factor greater than 60 IU/ml



   - Plasma hemoglobin greater than 0.05 gm/dl



   - Bilirubin greater than 50 mg/dl



   - Lipids greater than 1000 mg/dl



   - FDP greater than 20 ug/ml

 

 25  *******Because ethnic data is not always readily available,



   this report includes an eGFR for both -Americans and



   non- Americans.****



   The National Kidney Disease Education Program (NKDEP) does



   not endorse the use of the MDRD equation for patients that



   are not between the ages of 18 and 70, are pregnant, have



   extremes of body size, muscle mass, or nutritional status,



   or are non- or non-.



   According to the National Kidney Foundation, irrespective of



   diagnosis, the stage of the disease is based on the level of



   kidney function:



   Stage Description                      GFR(mL/min/1.73 m(2))



   1     Kidney damage with normal or decreased GFR       90



   2     Kidney damage with mild decrease in GFR          60-89



   3     Moderate decrease in GFR                         30-59



   4     Severe decrease in GFR                           15-29



   5     Kidney failure                       <15 (or dialysis)

 

 26  Acute inflammation:  >10.00

 

 27  Reference Range and Interpretation:



   TnI (ng/mL)             Interpretation



   Less Than 0.03 ng/mL    Not supportive of diagnosis of MI



   0.03 - 0.50 ng/mL       Indeterminate: suggest serial



   studies if clinically indicated.



   Greater than 0.5 ng/mL  Consistent with diagnosis of MI

 

 28  <5.0 Negative



   



   5.0 - 25.0  Indeterminate (Repeat testing recommended after



   72 hours)



   >25.0  Positive



   



   Perimenopausal women can display HCG levels of up to 20



   mIU/mL

 

 29  >100 to <200 pg/mL: likely compensated congestive heart



   failure (CHF)



   200 to 400 pg/mL: likely moderate CHF



   >400 pg/mL: likely moderate to severe CHF

 

 30  [place] Severe Sepsis and Septic Shock Management Bundle Measure



   requires all lactic acids initially measuring >2.0 mmol/L be



   repeated.

 

 31  1 SST

 

 32  A serum separator tube was received with no test indicated

 

 33  The requisition we received for the above patient has no test



   indicated on the request form for one or more of the specimens



   submitted.  The [place] Code of Federal Regulations requires a



   written and signed request be forwarded to the testing laboratory



   following the verbal order of a laboratory test.



   Date:_________________________________



   ICD-9/10 Diagnosis Code(s):___________________________________________



   Physician or Authorized Designee Signature:



   ______________________________________________________________________



   Your signature confirms your order of the test(s) listed



   Required test name(s):________________________________________________



   Required test number(s):______________________________________________



   Please provide requested information and fax to [phone]



   to expedite testing.

 

 34  No frozen plasma received.



   TEST:  [id number]  Angiotensin I

 

 35  Written Authorization Received.



   Authorization received from SIGNATURE ON FILE 2016



   Logged by Sydnie Baig

 

 36  *******Because ethnic data is not always readily available,



   this report includes an eGFR for both -Americans and



   non- Americans.****



   The National Kidney Disease Education Program (NKDEP) does



   not endorse the use of the MDRD equation for patients that



   are not between the ages of 18 and 70, are pregnant, have



   extremes of body size, muscle mass, or nutritional status,



   or are non- or non-.



   According to the National Kidney Foundation, irrespective of



   diagnosis, the stage of the disease is based on the level of



   kidney function:



   Stage Description                      GFR(mL/min/1.73 m(2))



   1     Kidney damage with normal or decreased GFR       90



   2     Kidney damage with mild decrease in GFR          60-89



   3     Moderate decrease in GFR                         30-59



   4     Severe decrease in GFR                           15-29



   5     Kidney failure                       <15 (or dialysis)

 

 37  <5.0 Negative



   



   5.0 - 25.0  Indeterminate (Repeat testing recommended after



   72 hours)



   >25.0  Positive



   



   Perimenopausal women can display HCG levels of up to 20



   mIU/mL

 

 38  Acute inflammation:  >10.00

 

 39  Reference Range and Interpretation:



   TnI (ng/mL)             Interpretation



   Less Than 0.03 ng/mL    Not supportive of diagnosis of MI



   0.03 - 0.50 ng/mL       Indeterminate: suggest serial



   studies if clinically indicated.



   Greater than 0.5 ng/mL  Consistent with diagnosis of MI

 

 40  *******Because ethnic data is not always readily available,



   this report includes an eGFR for both -Americans and



   non- Americans.****



   The National Kidney Disease Education Program (NKDEP) does



   not endorse the use of the MDRD equation for patients that



   are not between the ages of 18 and 70, are pregnant, have



   extremes of body size, muscle mass, or nutritional status,



   or are non- or non-.



   According to the National Kidney Foundation, irrespective of



   diagnosis, the stage of the disease is based on the level of



   kidney function:



   Stage Description                      GFR(mL/min/1.73 m(2))



   1     Kidney damage with normal or decreased GFR       90



   2     Kidney damage with mild decrease in GFR          60-89



   3     Moderate decrease in GFR                         30-59



   4     Severe decrease in GFR                           15-29



   5     Kidney failure                       <15 (or dialysis)

 

 41  Test Performed by:



   [facility] [place] [address]



   : Kalia Tinsley II, M.D., Ph.D.

 

 42  -------------------REFERENCE VALUE--------------------------



   7:00-9:00 am: 100-750



   3:00-5:00 pm: <401



   11:00 pm-Midnight: <100



   Test Performed by:



   [facility] [place] [address]



   : Kalia Tinsley II, M.D., Ph.D.

 

 43  -------------------REFERENCE VALUE--------------------------



   7:00-9:00 am: 100-750



   3:00-5:00 pm: <401



   11:00 pm-Midnight: <100



   Test Performed by:



   [place] [address]



   : Kalia Tinsley II, M.D., Ph.D.

 

 44  Test Performed by:



   [place] [address]



   : Kalia Tinsley II, M.D., Ph.D.

 

 45  SEE RESULT BELOW



   -----------------------------------------------------------------------------
---------------



   Name:  BEBA ORLANDO               : 2000    Attend Dr: Jens Us MD



   Acct:  [id number]  Unit: [id number]  AGE: 16            Location:  [facility]



   Re16                        SEX: F             Status:    REG ER



   -----------------------------------------------------------------------------
---------------



   



   SPEC: [id number]         VOLODYMYR:       [facility] DR: Jens Us MD



   REQ:  [id number]              RECD:   



   STATUS: ARIANA ONE[facility] DR: Tristan Delgado MD



   Visiting Nurse Service



   _



   SOURCE: THROAT         SPDESC:



   ORDERED:  Strep A Request



   



   -----------------------------------------------------------------------------
---------------



   Procedure                         Result                         Reported   
        Site



   -----------------------------------------------------------------------------
---------------



   Rapid Strep A Request  Final                                     16-
332      ML



   Specimen received for Rapid Strep A Molecular testing



   



   -----------------------------------------------------------------------------
---------------



   * ML - MAIN LAB (PSC1)



   .



   



   



   



   



   



   



   



   



   



   



   



   



   



   



   



   



   



   



   



   



   



   



   



   



   



   



   ** END OF REPORT **



   



   * ML=Testing performed at Main Lab



   DEPARTMENT OF PATHOLOGY,  [address] [place] [address]



   Phone # [phone]      Fax #[phone]



   Darrell Dunlap M.D. Director     [facility] # [id number]

 

 46  SEE RESULT BELOW



   -----------------------------------------------------------------------------
---------------



   Name:  BEBA ORLANDO               : 2000    Attend Dr: Jens Us MD



   Acct:  [id number]  Unit: [id number]  AGE: 16            Location:  [facility]



   Re16                        SEX: F             Status:    REG ER



   -----------------------------------------------------------------------------
---------------



   



   SPEC: [id number]         VOLODYMYR:       [facility] DR: Jens Us MD



   REQ:  [id number]              RECD:   



   STATUS: ARIANA ONEIL DR: Tristan Delgado MD



   Visiting Nurse Service



   _



   SOURCE: NASAL          SPDESC:



   ORDERED:  Flu A B Request



   



   -----------------------------------------------------------------------------
---------------



   Procedure                         Result                         Reported   
        Site



   -----------------------------------------------------------------------------
---------------



   Rapid Influenza A   B Request  Final                             16-
0332      ML



   Specimen received for Influenza A/B Molecular testing



   



   -----------------------------------------------------------------------------
---------------



   * ML - MAIN LAB ([facility])



   .



   



   



   



   



   



   



   



   



   



   



   



   



   



   



   



   



   



   



   



   



   



   



   



   



   



   



   ** END OF REPORT **



   



   * ML=Testing performed at Main Lab



   DEPARTMENT OF PATHOLOGY,  [address] [place] [address]



   Phone # [phone]      Fax #[phone]



   Darrell Dunlap M.D. Director     [facility] # [id number]

 

 47  *******Because ethnic data is not always readily available,



   this report includes an eGFR for both -Americans and



   non- Americans.****



   The National Kidney Disease Education Program (NKDEP) does



   not endorse the use of the MDRD equation for patients that



   are not between the ages of 18 and 70, are pregnant, have



   extremes of body size, muscle mass, or nutritional status,



   or are non- or non-.



   According to the National Kidney Foundation, irrespective of



   diagnosis, the stage of the disease is based on the level of



   kidney function:



   Stage Description                      GFR(mL/min/1.73 m(2))



   1     Kidney damage with normal or decreased GFR       90



   2     Kidney damage with mild decrease in GFR          60-89



   3     Moderate decrease in GFR                         30-59



   4     Severe decrease in GFR                           15-29



   5     Kidney failure                       <15 (or dialysis)

 

 48  : [id number] [employer]

 

 49  : [employer]



   The  and regulatory agencies both recommend that



   a throat culture for beta strep be performed if a Rapid



   Group A Strep assay yields a negative result.  Therefore a



   culture will be automatically performed on all negative



   samples.

 

 50  SEE RESULT BELOW



   -----------------------------------------------------------------------------
---------------



   Name:  BEBA ORLANDO               : 2000    Attend Dr: Jens Us MD



   Acct:  [id number]  Unit: [id number]  AGE: 16            Location:  [facility]



   Re16                        SEX: F             Status:    DEP ER



   -----------------------------------------------------------------------------
---------------



   



   SPEC: [id number]         VOLODYMYR:       [facility] DR: Jens Us MD



   REQ:  [id number]              RECD:   



   STATUS: ARIANA ONE[facility] DR: Tristan Delgado MD



   Visiting Nurse Service



   _



   SOURCE: THROAT         SPDESC:



   ORDERED:  Throat Beta Str



   



   -----------------------------------------------------------------------------
---------------



   Procedure                         Result                         Reported   
        Site



   -----------------------------------------------------------------------------
---------------



   Throat Beta Strep Culture  Final                                 16-
836      ML



   Negative For Group A Beta Streptococcus



   



   -----------------------------------------------------------------------------
---------------



   * ML - MAIN LAB ([facility])



   .



   



   



   



   



   



   



   



   



   



   



   



   



   



   



   



   



   



   



   



   



   



   



   



   



   



   



   ** END OF REPORT **



   



   * ML=Testing performed at Main Lab



   DEPARTMENT OF PATHOLOGY,  [address] [place] [address]



   Phone # [phone]      Fax #[phone]



   Darrell Dunlap M.D. Director     [facility] # [id number]

 

 51  Reference Range and Interpretation:



   TnI (ng/mL)             Interpretation



   Less Than 0.03 ng/mL    Not supportive of diagnosis of MI



   0.03 - 0.50 ng/mL       Indeterminate: suggest serial



   studies if clinically indicated.



   Greater than 0.5 ng/mL  Consistent with diagnosis of MI

 

 52  <5.0 Negative



   



   5.0 - 25.0  Indeterminate (Repeat testing recommended after



   72 hours)



   >25.0  Positive



   



   Perimenopausal women can display HCG levels of up to 20



   mIU/mL

 

 53  >100 to <200 pg/mL: likely compensated congestive heart



   failure (CHF)



   200 to 400 pg/mL: likely moderate CHF



   >400 pg/mL: likely moderate to severe CHF

 

 54  Result TnIDx:0.89 Called to [employer] at: 19:45:01 by:[id number] Read back by:
[id number]



   Reference Range and Interpretation:



   TnI (ng/mL)             Interpretation



   Less Than 0.03 ng/mL    Not supportive of diagnosis of MI



   0.03 - 0.50 ng/mL       Indeterminate: suggest serial



   studies if clinically indicated.



   Greater than 0.5 ng/mL  Consistent with diagnosis of MI

 

 55  [facility] Severe Sepsis and Septic Shock Management Bundle Measure



   requires all lactic acids initially measuring >2.0mmol/L be



   repeated.

 

 56  SEE RESULT BELOW



   -----------------------------------------------------------------------------
---------------



   Name:  BEBA ORLANDO               : 2000    Attend Dr: Arron Quan MD



   Acct:  [id number]  Unit: [id number]  AGE: 15            [facility]  [facility]



   Re/31/15                        SEX: F             Status:    DEP ER



   -----------------------------------------------------------------------------
---------------



   



   SPEC: [id number]         VOLODYMYR:   12/31/15-    [facility] DR: Arron Quan MD



   REQ:  [id number]              RECD:   12/31/



   STATUS: ARIANA ONEIL DR: Tristan Delgado MD



   Visiting Nurse Service



   _



   SOURCE: BLOOD,VENO     SPDESC:



   ORDERED:  Blood Cult



   



   -----------------------------------------------------------------------------
---------------



   Procedure                         Result                         Reported   
        Site



   -----------------------------------------------------------------------------
---------------



   Aerobic Culture Bottle  Final                                    16-
      ML



   No Growth Day 5



   



   Anaerobic Culture Bottle  Final                                  16-
      ML



   No Growth Day 5



   



   -----------------------------------------------------------------------------
---------------



   * ML - MAIN LAB (PSC1)



   .



   



   



   



   



   



   



   



   



   



   



   



   



   



   



   



   



   



   



   



   



   



   



   



   ** END OF REPORT **



   



   * ML=Testing performed at Main Lab



   DEPARTMENT OF PATHOLOGY,  [address] [place] [address]



   Phone # [phone]      Fax #[phone]



   Darrell Dunlap M.D. Director     [facility] # [id number]

 

 57  Normal Range 180 to 914



   Indeterminate Range 145 to 180



   Deficient Range  <145

 

 58  Acute inflammation:  >10.00

 

 59  Reference Range and Interpretation:



   TnI (ng/mL)             Interpretation



   Less Than 0.03 ng/mL    Not supportive of diagnosis of MI



   0.03 - 0.50 ng/mL       Indeterminate: suggest serial



   studies if clinically indicated.



   Greater than 0.5 ng/mL  Consistent with diagnosis of MI

 

 60  SEE RESULT BELOW



   -----------------------------------------------------------------------------
---------------



   Name:  BEBA ORLANDO               : 2000    Attend Dr: Arron Quan MD



   Acct:  [id number]  Unit: [id number]  AGE: 15            Location:  [facility]



   Re/25/15                        SEX: F             Status:    DEP ER



   -----------------------------------------------------------------------------
---------------



   



   SPEC: [id number]         VOLODYMYR:   09/25/    [facility] DR: Arron Quan MD



   REQ:  [id number]              RECD:   09/25/



   STATUS: ARIANA ONEIL DR: Tristan Delgado MD



   Visiting Nurse Service



   _



   SOURCE: BLOOD,VENO     SPDES:



   ORDERED:  Blood Cult



   



   -----------------------------------------------------------------------------
---------------



   Procedure                         Result                         Verified   
        Site



   -----------------------------------------------------------------------------
---------------



   Aerobic Culture Bottle  Final                                    09/30/15-
      ML



   No Growth Day 5



   



   Anaerobic Culture Bottle  Final                                  09/30/15-
      ML



   No Growth Day 5



   



   -----------------------------------------------------------------------------
---------------



   * ML - MAIN LAB ([facility])



   .



   



   



   



   



   



   



   



   



   



   



   



   



   



   



   



   



   



   



   



   



   



   



   



   ** END OF REPORT **



   



   * ML=Testing performed at Main Lab



   DEPARTMENT OF PATHOLOGY,  [address] [place] [address]



   Phone # [phone]      Fax #[phone]



   Darrell Dunlap M.D. Director     [facility] # [id number]

 

 61  If pregnancy is still suspected, please repeat test after



   48 to 72 hours.



   This test detects intact HCG only and is indicated for the



   early detection of pregnancy.

 

 62  *Ascorbic acid is present which may interfere with detection



   of blood.

 

 63  RIGHT ARM PICC INSERTION SITE

 

 64  SEE RESULT BELOW



   -----------------------------------------------------------------------------
---------------



   Name:  BEBA ORLANDO               : 2000    Attend Dr: Jacquelyn Ramos MD



   Acct:  [id number]  Unit: [id number]  AGE: 15            Location:  [facility]



   Re/01/15                        SEX: F             Status:    REG REF



   -----------------------------------------------------------------------------
---------------



   



   SPEC: [id number]         VOLODYMYR:   09/01/    SUBM DR: Jacquelyn Ramos MD



   REQ:  [id number]              RECD:   09/01/



   STATUS: RES



   _



   SOURCE: WOUND          SPDESC:



   ORDERED:  Anaerobic Cult, Culture   Stain



   COMMENTS: RIGHT ARM PICC INSERTION SITE



   QUERIES:  Specimen Description RIGHT ARM



   



   -----------------------------------------------------------------------------
---------------



   Procedure                         Result                         Verified   
        Site



   -----------------------------------------------------------------------------
---------------



   Anaerobic Culture



   PENDING



   



   Wound/Misc Gram Stain  Final                                     09/02/15-
0800      ML



   No Neutrophils Observed



   



   No Organisms Seen



   



   Wound/Misc Culture



   PENDING



   



   -----------------------------------------------------------------------------
---------------



   * ML - MAIN LAB ([facility]1)



   .



   



   



   



   



   



   



   



   



   



   



   



   



   



   



   



   



   



   ** END OF REPORT **



   



   * ML=Testing performed at Main Lab



   DEPARTMENT OF PATHOLOGY,  [address] [place] [address]



   Phone # [phone]      Fax #[phone]



   Darrell Dunlap M.D. Director     [facility] # [id number]

 

 65  SEE RESULT BELOW



   -----------------------------------------------------------------------------
---------------



   Name:  BEBA ORLANDO               : 2000    Attend Dr: Jacquelyn Ramos MD



   Acct:  [id number]  Unit: [id number]  AGE: 15            Location:  [facility]



   Re/01/15                        SEX: F             Status:    REG REF



   -----------------------------------------------------------------------------
---------------



   



   SPEC: [id number]         VOLODYMYR:   09/01/15-    [facility] DR: Jacquelyn Ramos MD



   REQ:  [id number]              RECD:   09/01/



   STATUS: COMP



   _



   SOURCE: WOUND          SPDESC:



   ORDERED:  Anaerobic Cult, Culture   Stain



   COMMENTS: RIGHT ARM PICC INSERTION SITE



   QUERIES:  Specimen Description RIGHT ARM



   



   -----------------------------------------------------------------------------
---------------



   Procedure                         Result                         Verified   
        Site



   -----------------------------------------------------------------------------
---------------



   Anaerobic Culture  Final                                         09/05/15-
1048      ML



   No Growth Day 4



   



   Wound/Misc Gram Stain  Final                                     09/02/15-
0800      ML



   No Neutrophils Observed



   



   No Organisms Seen



   



   Wound/Misc Culture  Final                                        09/03/15-
1125      ML



   No Growth Day 2



   



   -----------------------------------------------------------------------------
---------------



   * ML - MAIN LAB ([facility])



   .



   



   



   



   



   



   



   



   



   



   



   



   



   



   



   



   



   



   ** END OF REPORT **



   



   * ML=Testing performed at Main Lab



   DEPARTMENT OF PATHOLOGY,  [address] [place] [address]



   Phone # [phone]      Fax #[phone]



   Darrell Dunlap M.D. Director     [facility] # [id number]

 

 66  PT ON CEFTRIAXONE  BLOOD CULTURE FROM PICC LINE

 

 67  SEE RESULT BELOW



   -----------------------------------------------------------------------------
---------------



   Name:  BEBA ORLANDO               : 2000    Attend Dr: Tritsan Delgado MD



   Acct:  [id number]  Unit: [id number]  AGE: 15            Location:  [facility]



   Re/31/15                        SEX: F             Status:    REG REF



   -----------------------------------------------------------------------------
---------------



   



   SPEC: [id number]         VOLODYMYR:   08/31/15-    [facility] DR: Tristan Delgado MD



   REQ:  [id number]              RECD:   08/31/15-



   STATUS: COMP             OTHR DR: Visiting Nurse Service



   _



   SOURCE: BLOOD,LINE     SPDESC:



   ORDERED:  Blood Cult



   COMMENTS: PT ON CEFTRIAXONE



   BLOOD CULTURE FROM PICC LINE



   



   -----------------------------------------------------------------------------
---------------



   Procedure                         Result                         Verified   
        Site



   -----------------------------------------------------------------------------
---------------



   Aerobic Culture Bottle  Final                                    09/05/15-
2      ML



   No Growth Day 5



   



   Anaerobic Culture Bottle  Final                                  09/05/15-
1822      ML



   No Growth Day 5



   



   -----------------------------------------------------------------------------
---------------



   * ML - [facility] LAB ([facility])



   .



   



   



   



   



   



   



   



   



   



   



   



   



   



   



   



   



   



   



   



   



   



   



   ** END OF REPORT **



   



   * ML=Testing performed at Main Lab



   DEPARTMENT OF PATHOLOGY,  [address] [place] [address]



   Phone # [phone]      Fax #[phone]



   Darrell Dunlap M.D. Director     [facility] # [id number]

 

 68  SEE RESULT BELOW



   -----------------------------------------------------------------------------
---------------



   Name:  BEBA ORLANDO               : 2000    Attend Dr: Jacquelyn Ramos MD



   Acct:  [id number]  Unit: [id number]  AGE: 15            Location:  [facility]



   Re/31/15                        SEX: F             Status:    REG REF



   -----------------------------------------------------------------------------
---------------



   



   SPEC: [id number]         VOLODYMYR:   08/31/    [facility] DR: Jacquelyn Ramos MD



   REQ:  [id number]              RECD:   08/31/



   STATUS: COMP



   _



   SOURCE: BLOOD,VENO     SPDESC:



   ORDERED:  Blood Cult



   



   -----------------------------------------------------------------------------
---------------



   Procedure                         Result                         Verified   
        Site



   -----------------------------------------------------------------------------
---------------



   Aerobic Culture Bottle  Final                                    09/05/15-
      ML



   No Growth Day 5



   



   Anaerobic Culture Bottle  Final                                  09/05/15-
      ML



   No Growth Day 5



   



   -----------------------------------------------------------------------------
---------------



   * ML - MAIN LAB ([facility])



   .



   



   



   



   



   



   



   



   



   



   



   



   



   



   



   



   



   



   



   



   



   



   



   



   ** END OF REPORT **



   



   * ML=Testing performed at Main Lab



   DEPARTMENT OF PATHOLOGY,  [address] [place] [address]



   Phone # [phone]      Fax #[phone]



   Darrell Dunlap M.D. Director     [facility] # [id number]

 

 69  Acute inflammation:  >10.00

 

 70  SRC:URINE 1  URINE IN VACU EMMIE

 

 71  Source of Specimen: URINE 1  URINE IN VACU

 

 72  Source of Specimen: URINE 1  URINE IN VACU

 

 73  Acute inflammation:  >10.00

 

 74  Reference Range and Interpretation:



   TnI (ng/mL)             Interpretation



   Less Than 0.03 ng/mL    Not supportive of diagnosis of MI



   0.03 - 0.50 ng/mL       Indeterminate: suggest serial



   studies if clinically indicated.



   Greater than 0.5 ng/mL  Consistent with diagnosis of MI

 

 75  >100 to <200 pg/mL: likely compensated congestive heart



   failure (CHF)



   200 to 400 pg/mL: likely moderate CHF



   >400 pg/mL: likely moderate to severe CHF

 

 76  This test was developed and its performance characteristics determined



   by [facility]. It has not been cleared or approved by the



   U.S. Food and Drug Administration. The FDA has determined that such



   clearance or approval is not necessary. This test is used for clinical



   purposes. It should not be regarded as investigational or research.

 

 77  IgG titers if 1:64 or greater indicate exposure or  acute and



   convalescent samples showing a four-fold increase, and/or the presence



   of IgM indicate recent or current infection.

 

 78  HGE IgG levels are detectable 7 to 10 days post infection and persist



   approximately one year.

 

 79  IgM levels usually rise 3 to 5 days post infection and fall to normal



   levels in approximately 30 to 60 days.

 

 80  Note: Bartonella henselae is now regarded as the etiologic agent of



   Cat Scratch Disease, bacillary angiomatosis, endocarditis and fever



   with bacteremia.  Bartonella rodriguez also causes bacillary



   angiomatosis particularly among immunocompromised patients, and trench



   fever.



   This test was developed and its performance characteristics determined



   by [facility].  It has not been cleared or approved by the Food and Drug



   Administration.  The FDA has determined that such clearance or



   approval is not necessary.

 

 81  Reference Range and Interpretation:



   TnI (ng/mL)             Interpretation



   Less Than 0.03 ng/mL    Not supportive of diagnosis of MI



   0.03 - 0.50 ng/mL       Indeterminate: suggest serial



   studies if clinically indicated.



   Greater than 0.5 ng/mL  Consistent with diagnosis of MI

 

 82  CALLED RAND @ 2000 FOR LAV (QNS)

 

 83  CALLED RAND @ 2000 FOR LAV (QNS)

 

 84  [MG] affected by ICTERUS

 

 85  Reference Range and Interpretation:



   TnI (ng/mL)             Interpretation



   Less Than 0.03 ng/mL    Not supportive of diagnosis of MI



   0.03 - 0.50 ng/mL       Indeterminate: suggest serial



   studies if clinically indicated.



   Greater than 0.5 ng/mL  Consistent with diagnosis of MI

 

 86  Acute inflammation:  >10.00

 

 87  Serologic response to B. burgdorferi infection is not



   detected, but cannot rule out early infection during



   which low or undetectable antibody levels to



   B. burgdorferi may be present. If clinically indicated,



   a new serum specimen should be submitted in 7-14 days.



   Test Performed by:



   [address] [address]



   : Kalia Tinsley II, M.D., Ph.D.







Procedures







 Date  Code  Description  Status

 

 2019  47553  Destruction-1 Beign Lesion  Completed

 

 2017  52654  Pulse Oximetry  Completed

 

 2017  01246  Vision Test- screening test of visual acuity,  Completed



     quantitative, bila  

 

 2016  69004  Electrocardiogram Complete  Completed

 

 2015  85724  Electrocardiogram Complete  Completed

 

 2015  22180  Electrocardiogram Complete  Completed

 

 2015  49402  Electrocardiogram Complete  Completed







Encounters







 Type  Date  Location  Provider  Dx  Diagnosis

 

 Office Visit  10/15/2018  Main Office  Tristan Delgado  J06.9  Acute upper



   4:40p    M.D.    respiratory



           infection,



           unspecified









 J20.9  Acute bronchitis, unspecified

 

 R07.89  Other chest pain









 Office Visit  2018  3:00p  Main Office  Dionne Dickey  Z11.1  
Encounter for



       Afnp-C    screening for



           respiratory



           tuberculosis

 

 Office Visit  2018  2:30p  Main Office  SHANNAN Baxter23  
Encounter for



       Afnp-C    immunization









 Z00.00  Encntr for general adult medical exam w/o abnormal findings

 

 Z11.1  Encounter for screening for respiratory tuberculosis









 Office Visit  2018  6:30p  Main Office  Brenda Mora  J02.9  Acute 
pharyngitis,



       Majro, NP    unspecified

 

 Office Visit  2018  3:00p  [facility] Office  Brenda Mora  F43.0  Acute stress



       Major, NP    reaction

 

 Office Visit  2018  8:45a  [facility]  Gemma CHAWLA  R30.0  Dysuria



       Tejas, NP    









 B35.4  Tinea corporis









 Office Visit  2017  2:00p  [facility]  Tristan GRANADOS90.0  Attn-luke Delgado M.D.    hyperactivity



           disorder, predom



           inattentive type









 I47.2  Ventricular tachycardia

 

 F43.12  Post-traumatic stress disorder, chronic









 Office Visit  10/24/2017 11:20a  Main Office  Tristan T. Midura,  F90.0  Attn-
defct



       M.D.    hyperactivity



           disorder, predom



           inattentive type









 I47.2  Ventricular tachycardia

 

 F43.12  Post-traumatic stress disorder, chronic









 Office Visit  2017  3:40p  [facility]  Jacquelyn Ramos  J06.9  Acute 
upper



       M.D.    respiratory



           infection,



           unspecified

 

 Office Visit  2017  8:20p  [facility] Office  Tristan STAUFFER  Z00.00  Encntr for 
general



       ROLAN Delgado    adult medical exam



           w/o abnormal



           findings









 A69.20  Lyme disease, unspecified

 

 I47.2  Ventricular tachycardia

 

 F41.1  Generalized anxiety disorder

 

 F43.12  Post-traumatic stress disorder, chronic

 

 R82.99  Other abnormal findings in urine









 Office Visit  2017 11:00a  Main Office  Nicho JURADO  A69.20  Lyme diseaseAline M.D.    unspecified

 

 Office Visit  2017  4:00p  [facility] Office  Tristan Delgado,  N64.4  
Mastodynia



       M.D.    









 I47.2  Ventricular tachycardia









 Office Visit  10/18/2016 10:30a  [facility] Office  Dionne Dickey,  R94.6  
Abnormal results



       Afnp-C    of thyroid



           function studies









 M79.675  Pain in left toe(s)

 

 Z79.3  Long term (current) use of hormonal contraceptives









 Office Visit  2016  8:20a  [facility]  Tristan Delgado,  R53.83  
Other fatigue



       M.D.    









 I47.2  Ventricular tachycardia

 

 A69.20  Lyme disease, unspecified

 

 Z72.51  High risk heterosexual behavior

 

 Z32.02  Encounter for pregnancy test, result negative









 Office Visit  2016  2:00p  [facility]  AMY Reich06.9  Acute 
upper



       FNP    respiratory



           infection,



           unspecified

 

 Office Visit  2016  2:20p  [facility] Office  Tristan Delgado,  I47.2  
Ventricular



       M.D.    tachycardia









 A69.20  Lyme disease, unspecified

 

 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









 Office Visit  2016  4:00p  [facility] Office  Tristan Delgado,  R07.89  Other 
chest pain



       M.D.    

 

 Office Visit  2016  1:10p  [facility] Office  Tristan Delgado,  A69.20  Lyme 
disease,



       MCandidoDCandido    unspecified









 I47.2  Ventricular tachycardia









 Office Visit  2016 11:20a  [facility] Office  Tristan Delgado,  A69.20  Lyme 
disease,



       M.D.    unspecified









 I47.2  Ventricular tachycardia









 Office Visit  2016 11:20a  [facility] Office  Tristan Delgado,  A69.20  Lyme 
disease,



       M.D.    unspecified









 I47.2  Ventricular tachycardia









 Office Visit  01/15/2016  2:10p  [facility] Office  Tristan Delgado,  A09  Infectious



       M.D.    gastroenteritis and



           colitis, unspecified









 I47.2  Ventricular tachycardia









 Office Visit  2016  4:20p  [facility] Office  Tristan Delgado  I47.2  
Ventricular



       M.D.    tachycardia









 J06.9  Acute upper respiratory infection, unspecified

 

 M79.1  Myalgia









 Office Visit  2015  2:00p  [facility] Office  Tristan Delgado,  J06.9  Acute 
upper



       M.D.    respiratory



           infection,



           unspecified









 I47.2  Ventricular tachycardia

 

 A69.20  Lyme disease, unspecified

 

 M79.1  Myalgia

 

 M06.4  Inflammatory polyarthropathy









 Office Visit  2015 11:15a  [facility] Office  Tristan Delgado  I47.2  
Ventricular



       M.D.    tachycardia

 

 Office Visit  2015  4:40p  [facility] Office  Tristan Delgado,  A69.20  Lyme 
disease,



       M.D.    unspecified









 I47.2  Ventricular tachycardia









 Office Visit  2015  1:15p  [facility]  Jo  I47.2  
Ventricular



       Giovany, FNP    tachycardia









 A69.29  Other conditions associated with Lyme disease

 

 B37.3  Candidiasis of vulva and vagina

 

 Z00.129  Encntr for routine child health exam w/o abnormal findings

 

 R10.30  Lower abdominal pain, unspecified









 Office Visit  10/12/2015  2:10p  [facility] Office  Tristan Delgado,  A69.29  Other 
conditions



       M.D.    associated with



           Lyme disease









 I47.2  Ventricular tachycardia









 Office Visit  2015  2:45p  [facility] Office  HOWARD Reich  728.89  
Muscle Disorders



           Other









 787.02  Nausea Alone









 Office Visit  2015  5:40p  [facility] Office  Tristan STAUFFER  088.81  Lyme Disease



       Sandy MCandidoD.    

 

 Office Visit  2015  3:10p  [facility] Office  Jacoby Taylor  088.81  Lyme Disease



       M.D.    

 

 Office Visit  2015 11:20a  [facility]  Jacquelyn Ramos,  626.1  
Menstruation



       M.D.    Scanty Or



           Infrequent









 088.81  Lyme Disease

 

 780.60  Fever, Unspecified

 

 788.1  Dysuria









 Office Visit  2015 10:00a  [facility] Office  Tristan Delgado M.D.  088.81  
Lyme Disease









 427.1  Paroxysmal Ventricular Tachycardia









 Office Visit  2015  3:10p  [facility] Office  Tristan Delgado M.D.  924.20  
Contusion Foot









 893.0  Open Wound Toe(S) W/O Complication

 

 088.81  Lyme Disease

 

 E918  Accidentally Caught In Or Between Objects









 Office Visit  2015  1:40p  [facility]  Tristan Delgado  088.81  
Lyme Disease



       M.DCandido    









 427.1  Paroxysmal Ventricular Tachycardia

 

 789.06  Pain Abdominal Epigastric









 Office Visit  2015  4:20p  [facility] Office  Tristan Delgado M.D.  088.81  
Lyme Disease









 427.1  Paroxysmal Ventricular Tachycardia

 

 465.9  URI  Upper Respiratory Infections Acute Unspec Sites









 Office Visit  2015  2:40p  [facility] Office  Tristan Delgado M.D.  088.81  
Lyme Disease









 427.1  Paroxysmal Ventricular Tachycardia









 Office Visit  2015 11:40a  [facility]  Tristan Delgado  088.81  
Lyme Disease



       M.DCandido    









 427.1  Paroxysmal Ventricular Tachycardia









 Office Visit  2015  2:40p  [facility] Office  Tristan Delgado M.D.  088.81  
Lyme Disease









 427.1  Paroxysmal Ventricular Tachycardia

 

 789.06  Pain Abdominal Epigastric









 Office Visit  2015  9:40a  [facility] Office  Tristan Delgado M.D.  088.81  
Lyme Disease









 427.1  Paroxysmal Ventricular Tachycardia









 Office Visit  2015  2:20p  [facility]  Tristan Delgado  088.81  
Lyme Disease



       M.DCandido    









 427.1  Paroxysmal Ventricular Tachycardia







Plan of Treatment

2019 - Jo Adair, FNPD22.5 Melanocytic nevi of trunkComments:we 
will refer you to dermatology, Dr Meier

## 2019-04-23 NOTE — XMS REPORT
Continuity of Care Document (CCD)

 Created on:2019



Patient:Beba Orlando

Sex:Female

:2000

External Reference #:[id number]





Demographics







 Address  [address] [place] [address]

 

 Home Phone  [phone]

 

 Mobile Phone  [phone]

 

 Email Address  [email or web address]

 

 Preferred Language  en

 

 Marital Status  Not  or 

 

 Hoahaoism Affiliation  Unknown

 

 Race  White

 

 Ethnic Group  Not  or 









Author







 Name  HOWARD Gomez

 

 Address  [address]



   Unavailable



   [place] [address]









Support







 Name  Relationship  Address  Phone

 

 Lisbeth Orlando  Mother  Unavailable  [phone]









Care Team Providers







 Name  Role  Phone

 

 Tristan Delgado  Care Team Information   Unavailable

 

 Tristan Delgado  Primary Care Physician  Unavailable









Payers







 Date  Identification Numbers  Payment Provider  Subscriber

 

 Effective: 2017  Policy Number: [id number]  BC/BS Of [employer] Greene[employer] Karlos









 PayID: [id number]  [address] [place] [id number]







Advance Directives







 Description

 

 No Information Available







Problems







 Active Problems  Provider  Date

 

 Lyme disease  Tristan Delgado M.D.  Onset: 2015

 

 Paroxysmal ventricular tachycardia  Tristan Delgado M.D.  Onset: 2015

 

 Epigastric pain  Tristan Delgado M.D.  Onset: 2015

 

 Acute upper respiratory infection  Tristan Delgado M.D.  Onset: 2015

 

 Contusion of foot  Tristan Delgado M.D.  Onset: 2015

 

 Open wound of toe without complication  Tristan Delgado M.D.  Onset: 2015

 

 Myalgia  Tristan Delgado M.D.  Onset: 2015

 

 Inflammatory polyarthropathy  Tristan Delgado M.D.  Onset: 2015

 

 Infectious colitis, enteritis and  Tristan Delgado M.D.  Onset: 01/15/2016



 gastroenteritis    

 

 Sarcoid heart muscle disease  Tristan Delgado M.D.  Onset: 2016

 

 Malaise and fatigue  Tristan Delgado M.D.  Onset: 2016

 

 High risk sexual behavior  Tristan Delgado M.D.  Onset: 2016

 

 Pain of breast  Tristan Delgado M.D.  Onset: 2017

 

 Generalized anxiety disorder  Tristan Delgado M.D.  Onset: 2017

 

 Posttraumatic stress disorder  Tristan Delgado M.D.  Onset: 2017

 

 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
2017



 predominantly inattentive type    

 

 Acute bronchitis  Tristan Delgado M.D.  Onset: 10/15/2018

 

 Chest pain  Tristan Delgado M.D.  Onset: 10/15/2018







Family History







 Date  Family Member(s)  Observation  Comments

 

   Father  Hypothyroidism  

 

   Father  62  

 

   Father  Osteoporosis  

 

   Father  Osteoarthritis  

 

   Mother  Asthma  

 

   Mother  50  

 

   Mother  Seasonal Allergies  

 

   Mother  Irritable Bowel Syndrome  







Social History







 Type  Date  Description  Comments

 

 Birth Sex    Unknown  

 

 Marital Status    Single  

 

 Lives With    Male Partner  

 

 Diet    Healthy, Well Balanced  

 

 Tobacco Use  Start: Unknown  Never Smoked Cigarettes  

 

 Smoking Status  Reviewed: 18  Never Smoked Cigarettes  

 

 ETOH Use    Denies alcohol use  

 

 Tobacco Use  Start: Unknown  nonsmoker  

 

 Recreational Drug Use    Marijuana  occ

 

 Exercise Type/Frequency    Exercises sporadically  

 

 Sun Exposure    Uses sunscreen  occ also does not



       stay in strong sun

 

 Seat Belt/Car Seat    Always uses seat belt  







Allergies, Adverse Reactions, Alerts







 Active Allergies  Reaction  Severity  Comments  Date

 

 Hepatitis B Vaccine  Anaphylaxis      2015

 

 Nickel        2015

 

 Environmental        2015

 

 Pepcid  full body rash      2015

 

 Chlorhexidine  blistering rash      2015

 

 Tramadol  hives, difficulty breathing  Severe    10/12/2015

 

 Adhesives  Contact dermatitis    except paper tape  2015

 

 Topical Mark Meds        2017







Medications







 Active Medications  SIG  Qnty  Indications  Ordering  Date



         Provider  

 

 Note  Due To Health  Needs to be able to      Tristan Delgado,  10/15/2018



 Issues  use elevator due to      M.D.  



   heart condition.        

 

 Ondansetron  take one tablet by  30tabs    Tristan Delgado,  2017



           4mg Tablets  mouth four times a      M.D.  



 Dispers  day as needed for        



   nausea        

 

 Atenolol  1  po bid prn      Unknown  



        50mg Tablets          



           

 

 Ogestrel  take 1 tablet by  3packs    Brenda Mora  



        0.5-50mg-mcg  mouth every day      SNEHAL Major  



 Tablets  continuously        



           

 

 Desloratadine  1 by mouth every      Unknown  



             5mg  day        



 Tablets          



           









 History Medications









 Azithromycin  2 by mouth today  6tabs  J20.9  Tristan Delgado,  10/15/2018 -



              250mg  and 1 by mouth x 4      M.D.  2018



 Tablets  days        



           

 

 Macrobid  take one by mouth  10caps  R30.0  Gemma CHAWLA  2018 -



          100mg  twice daily until      Tejas, SNEHAL  2018



 Capsules  gone        



           

 

 Strattera  1 PO AT hs  30caps  F90.0  Tristan Delgado,  2017 -



           25mg        M.D.  2018



 Capsules          



           

 

 Bupropion HCL ER  take 1 tablet by  30tabs  F90.0  Tristan Delgado,  10/24/2017 
-



 (SR)  mouth every      M.D.  2017



      100mg Tablets  morning        



 ER 12HR          



           

 

 Note For Work  was  seen  here      Nicho JURADO  2017 -



   15  and      ROLAN Mireles  2017



   could not work  or        



    be at  school        



   that  day        

 

 Note  Due To Health  Needs to be    F41.1  Tristan Delgado,  2017 -



 Issues  allowed to have an      M.DCandido  2018



   emotional support        



   dog.        

 

 Atovaquone  10 ML PO qd  210ml    Tristan Delgado,  2017 -



         M.D.  2017



 750mg/5ML          



 Suspension          



           

 

 Doxycycline  Take 1 Capsule By  60caps    Nicho JURADO  2017 -



 Monohydrate  Mouth Two Times      ROLAN Mireles  2017



             100mg  Daily        



 Capsules          



           

 

 Note  Due To Health  Unable to work at      Tristan Delgado,  2016 -



 Issues  present time due      M.DCandido  10/18/2016



   to fatigue from        



   chronic lyme        



   disease and heart        



   disease.        

 

 Note  please remove picc      Tristan Delgado,  2016 -



   line      M.D.  2016

 

 PICC Line Dressing  weekly and kian      Nicho JURADO  2016 -



 Changes  picc line dressing      ROLAN Mireles  2016



   changes per        



   protocal        

 

 Penicillin V  1 by mouth twice a  20tabs    Jamar Echavarria,  2015 -



 Potassium  day      M.D.  2016



           500mg          



 Tablets          



           

 

 Lansoprazole  take one capsule      [facility]  2015 -



              30mg  by mouth every day      [facility]  10/18/2016



 Capsules DR  as needed      [place]  



           

 

 Fluconazole  1 by mouth for  2tabs    Tristan Delgado,  2015 -



             150mg  yeast infection.      M.D.  2015



 Tablets  may repeat in 5-7        



   days        

 

 Note  discontinue PICC      Tristan Delgado,  10/30/2015 -



   line      M.D.  2015

 

 PICC Line Dressing  Weekly picc line      Jacquelyn Ramos,  2015 -



 Changes  dressing changes      M.D.  2016



   per protocal        

 

 Ceftriaxone Sodium  infuse 5 days per    A69.29  Tristan Delgado,  2015 -



   week times 30 days      M.D.  2016



 2gm Solution Rec          



           

 

 Nystatin  apply to affected  1bottle    Jacquelyn Ramos,  2015 -



           Powder  area as directed      M.D.  2015



           

 

 Heparin Lock Flush  as directed  30units    Tristan Delgado,  2015 -



 For Flushing        M.D.  10/30/2015



 [facility]          



 Devices          



         100Unit/ML          



 Solution          



           

 

 Famotidine  1 twice a day as  60tabs  789.06  Tristan Delgado,  2015 -



            20mg  needed for stomach      M.D.  2015



 Tablets          



           

 

 Ceftriaxone Sodium  Infuse 5 days per    088.81  Tristan Delgado,  2015 -



   week times 30 days      M.D.  2015



 2gm Solution Rec          



           

 

 Amoxicillin  Take One Tablet By  90tabs  A69.29  Tristan Delgado,  2015 -



             875mg  Mouth Three Times      M.D.  2015



 Tablets  A Day        



           

 

 Atovaquone-Proguani  1 by mouth every  14tabs  088.81  Tristan Delgado,  2015 -



 l HCL  day      M.D.  2015



       250-100mg          



 Tablets          



           

 

 Naproxen  Take One Tablet By  60tabs    Tristan Delgado,  2015 -



          500mg  Mouth Twice A Day      M.D.  2015



 Tablets  as Needed For Pain        



           

 

 Azithromycin  Take One Tablet By  30tabs  088.81  Tristan Delgado,  2015 -



              500mg  Mouth Daily      M.D.  2015



 Tablets          



           

 

 Cefuroxime Axetil  1 by mouth twice a  60tabs  088.81  Tristan Delgado,  2015 -



   day      M.D.  2015



 500mg Tablets          



           

 

 Birth Control Pill        Tristan Delgado,  2015 -



         M.D.  10/12/2015

 

 Azithromycin  1 by mouth every      Unknown   -



   day        2016



 Tablets          



           

 

 Mepron  5ml bid      Unknown   -



         Suspension          2016



           

 

 Atenolol  1 by mouth twice      Unknown   -



          25mg  daily        2017



 Tablets          



           

 

 Metoprolol Tartrate  take one tablet by  60tabs    Tristan Delgado,   
-



   mouth twice a day      M.D.  2016



 25mg Tablets          



           

 

 Ibuprofen  2 by mouth every 6      Unknown   -



           200mg  hours as needed        01/15/2016



 Tablets          



           

 

 Probiotic  2 po bid      Unknown   -



            Capsules          2016



           

 

 Flecainide Acetate  2 by mouth twice a      Unknown   -



   day        2017



 50mg Tablets          



           

 

 Hydrocodone-Acetami  1 every 6 hours as  40tabs    Jamar Echavarria,   -



 nophen  needed for tooth      M.D.  2016



        5-325mg  pain - disregard        



 Tablets  previous Rx for        



   "every 12 hours"        



   from today        

 

 Tramadol HCL  1 by mouth every 6      Unknown   -



              50mg  hours as needed        10/12/2015



 Tablets          



           

 

 Naproxen  1 po bid      Unknown   -



           Tablets          2015



           

 

 Flecainide Acetate  1 po bid      Unknown   -



           2015



 100mg Tablets          



           







Medications Administered in Office







 Medication  SIG  Qnty  Indications  Ordering Provider  Date

 

 TB Intradermal Test        Jacinda Baxter  2018



             Injection          



           

 

 TB Intradermal Test        Tristan Delgado M.D.  2016



             Injection          



           







Immunizations







 CPT Code  Status  Date  Vaccine  Lot #

 

 [id number]  Given  2018  Tdap Tetanus, W Pertussis  [id number]

 

 [id number]  Given  2013  Hepatitis B Immunization, Sawyer-19 Years  

 

 17455  Given  10/18/2012  Hepatitis B Immunization, Sawyer-19 Years  

 

 50033  Given  2012  Hepatitis B Immunization, Sawyer-19 Years  

 

 59041  Given  2008  Tdap Tetanus, W Pertussis  

 

 97758  Given  2007  MMR Virus Immunization  

 

 32104  Given  2001  MMR Virus Immunization  

 

 05921  Given  1999  DTaP Immunization  

 

 90860  Given  1999  Hib PRP-T Conjugate 4 Dose Schedule  

 

 67609  Given  1998  DTaP Immunization  

 

 01886  Given  1998  (IPV) Inactive Poliovirus Vaccine  

 

 29449  Given  1998  (IPV) Inactive Poliovirus Vaccine  

 

 89503  Given  1998  DTaP Immunization  

 

 18329  Given  1998  Hib PRP-T Conjugate 4 Dose Schedule  

 

 37104  Given  1998  Varicella (Chicken Pox) Immunization  

 

 11183  Given  1998  (IPV) Inactive Poliovirus Vaccine  

 

 29495  Given  1998  DTaP Immunization  

 

 05154  Given  1998  Hib PRP-T Conjugate 4 Dose Schedule  







Vital Signs







 Date  Vital  Result  Comment

 

 2019  4:15pm  BP Systolic  118 mmHg  









 BP Diastolic  62 mmHg  

 

 Heart Rate  76 /min  

 

 Body Temperature  98.7 F  

 

 Respiratory Rate  17 /min  

 

 Height  63.5 inches  5'3.50"

 

 Weight  150.00 lb  

 

 BMI (Body Mass Index)  26.2 kg/m2  









 2019  3:53pm  BP Systolic  120 mmHg  









 BP Diastolic  80 mmHg  

 

 Heart Rate  68 /min  

 

 Body Temperature  98.8 F  

 

 Respiratory Rate  16 /min  

 

 Height  63.5 inches  5'3.50"

 

 Weight  154.00 lb  

 

 BMI (Body Mass Index)  26.8 kg/m2  









 10/15/2018  4:48pm  BP Systolic  100 mmHg  









 BP Diastolic  60 mmHg  

 

 Heart Rate  80 /min  

 

 Body Temperature  99.2 F  

 

 Respiratory Rate  18 /min  

 

 Height  63.5 inches  5'3.50"

 

 Weight  142.00 lb  

 

 BMI (Body Mass Index)  24.8 kg/m2  









 2018  2:42pm  BP Systolic  128 mmHg  









 BP Diastolic  60 mmHg  

 

 Heart Rate  68 /min  

 

 Body Temperature  98.6 F  

 

 Respiratory Rate  16 /min  

 

 Height  63.5 inches  5'3.50"

 

 Weight  134.00 lb  

 

 BMI (Body Mass Index)  23.4 kg/m2  

 

 Right Visual Acuity Distance  20/20  

 

 Left Visual Acuity Distance  20/20  









 2018  6:24pm  BP Systolic  120 mmHg  









 BP Diastolic  70 mmHg  

 

 Heart Rate  68 /min  

 

 Body Temperature  98.8 F  

 

 Respiratory Rate  18 /min  

 

 Height  63.5 inches  5'3.50"  measured 17

 

 Weight  133.00 lb  

 

 BMI (Body Mass Index)  23.2 kg/m2  









 2018  2:57pm  BP Systolic  108 mmHg  









 BP Diastolic  60 mmHg  

 

 Heart Rate  72 /min  

 

 Body Temperature  97.6 F  

 

 Respiratory Rate  17 /min  

 

 Height  63.5 inches  5'3.50"  measured 2018  8:45am  BP Systolic  112 mmHg  









 BP Diastolic  60 mmHg  

 

 Heart Rate  64 /min  

 

 Body Temperature  98.2 F  

 

 Height  63.5 inches  5'3.50"  measured 17

 

 Weight  134.00 lb  

 

 BMI (Body Mass Index)  23.4 kg/m2  









 2017  1:59pm  BP Systolic  100 mmHg  









 BP Diastolic  64 mmHg  

 

 Heart Rate  84 /min  

 

 Body Temperature  97.7 F  

 

 Respiratory Rate  16 /min  

 

 Height  63.5 inches  5'3.50"  measured 17

 

 Weight  138.50 lb  

 

 BMI (Body Mass Index)  24.1 kg/m2  

 

 Body Mass Index Percentile  73 %  

 

 Weight Percentile  67th  

 

 Height Percentile  38 %  









 10/24/2017 11:34am  BP Systolic  100 mmHg  









 BP Diastolic  60 mmHg  

 

 Heart Rate  90 /min  

 

 Body Temperature  97.3 F  

 

 Respiratory Rate  16 /min  

 

 Height  63.5 inches  5'3.50"  measured 17

 

 Weight  142.00 lb  

 

 BMI (Body Mass Index)  24.8 kg/m2  

 

 Body Mass Index Percentile  77 %  

 

 Weight Percentile  72nd  

 

 Height Percentile  38 %  









 2017  3:35pm  BP Systolic  100 mmHg  









 BP Diastolic  60 mmHg  

 

 Heart Rate  80 /min  

 

 Body Temperature  98.5 F  

 

 Respiratory Rate  16 /min  

 

 O2 % BldC Oximetry  98 %  

 

 Height  63.5 inches  5'3.50"  measured 17

 

 Weight  137.38 lb  

 

 BMI (Body Mass Index)  24.0 kg/m2  

 

 Body Mass Index Percentile  72 %  

 

 Weight Percentile  66th  

 

 Height Percentile  38 %  









 2017  8:19pm  BP Systolic  104 mmHg  









 BP Diastolic  60 mmHg  

 

 Heart Rate  78 /min  

 

 Body Temperature  98.4 F  

 

 Respiratory Rate  16 /min  

 

 Height  63.5 inches  5'3.50"  measured 17

 

 Weight  142.38 lb  

 

 BMI (Body Mass Index)  24.8 kg/m2  

 

 Body Mass Index Percentile  78 %  

 

 Right Visual Acuity Distance  20/20  

 

 Left Visual Acuity Distance  20.20  









 2017 10:59am  BP Systolic  90 mmHg  









 BP Diastolic  50 mmHg  

 

 Heart Rate  68 /min  

 

 Body Temperature  98.8 F  

 

 Respiratory Rate  16 /min  

 

 Height  64 inches  5'4"

 

 Weight  139.00 lb  

 

 BMI (Body Mass Index)  23.9 kg/m2  

 

 Body Mass Index Percentile  72 %  









 2017  4:11pm  BP Systolic  98 mmHg  









 BP Diastolic  60 mmHg  

 

 Heart Rate  72 /min  

 

 Body Temperature  99.3 F  

 

 Respiratory Rate  16 /min  

 

 Height  64 inches  5'4"

 

 Weight  136.38 lb  

 

 BMI (Body Mass Index)  23.4 kg/m2  

 

 Body Mass Index Percentile  69 %  









 10/18/2016 10:32am  BP Systolic  110 mmHg  









 BP Diastolic  60 mmHg  

 

 Heart Rate  72 /min  

 

 Body Temperature  97.7 F  

 

 Height  64 inches  5'4"

 

 Weight  132.00 lb  

 

 BMI (Body Mass Index)  22.7 kg/m2  

 

 Body Mass Index Percentile  63 %  

 

 Weight Percentile  62nd  

 

 Height Percentile  46 %  









 2016  8:27am  BP Systolic  96 mmHg  









 BP Diastolic  60 mmHg  

 

 Heart Rate  72 /min  

 

 Body Temperature  98.2 F  

 

 Respiratory Rate  16 /min  

 

 Weight  137.00 lb  

 

 Weight Percentile  702016  1:59pm  BP Systolic  118 mmHg  









 BP Diastolic  64 mmHg  

 

 Heart Rate  72 /min  

 

 Body Temperature  98.7 F  

 

 Respiratory Rate  16 /min  

 

 Weight  132.00 lb  

 

 Weight Percentile  63rd  









 2016  2:24pm  BP Systolic  110 mmHg  









 BP Diastolic  68 mmHg  

 

 Heart Rate  68 /min  

 

 Body Temperature  98.3 F  

 

 Respiratory Rate  16 /min  

 

 Weight  130.00 lb  

 

 Weight Percentile  60th  









 2016  4:17pm  BP Systolic  108 mmHg  lying pulse 74









 BP Diastolic  52 mmHg  lying pulse 74

 

 BP Systolic Recheck  100 mmHg  sitting pulse 74

 

 BP Diastolic Recheck  58 mmHg  sitting pulse 74









 2016  1:12pm  BP Systolic  112 mmHg  









 BP Diastolic  70 mmHg  

 

 Heart Rate  68 /min  

 

 Body Temperature  98.7 F  

 

 Respiratory Rate  18 /min  

 

 Weight  132.00 lb  

 

 Weight Percentile  64th  









 2016 11:37am  BP Systolic  114 mmHg  









 BP Diastolic  62 mmHg  

 

 Heart Rate  80 /min  

 

 Body Temperature  98.4 F  

 

 Respiratory Rate  16 /min  

 

 Weight  129.38 lb  

 

 Weight Percentile  602016 11:24am  BP Systolic  90 mmHg  









 BP Diastolic  60 mmHg  

 

 Heart Rate  84 /min  

 

 Body Temperature  97.7 F  

 

 Respiratory Rate  16 /min  

 

 Weight  133.12 lb  

 

 Weight Percentile  66th  









 01/15/2016  2:16pm  BP Systolic  106 mmHg  









 BP Diastolic  64 mmHg  

 

 Heart Rate  78 /min  

 

 Body Temperature  98.1 F  

 

 Respiratory Rate  16 /min  

 

 Weight  130.38 lb  

 

 Weight Percentile  62nd  









 2016  4:32pm  BP Systolic  106 mmHg  









 BP Diastolic  44 mmHg  

 

 Heart Rate  96 /min  

 

 Body Temperature  100.0 F  

 

 Respiratory Rate  16 /min  

 

 Weight  131.25 lb  

 

 Weight Percentile  64th  









 2015  1:59pm  BP Systolic  108 mmHg  









 BP Diastolic  68 mmHg  

 

 Heart Rate  96 /min  

 

 Body Temperature  99.3 F  

 

 Respiratory Rate  16 /min  

 

 Weight  131.00 lb  

 

 Weight Percentile  63rd  









 2015  4:38pm  BP Systolic  106 mmHg  









 BP Diastolic  60 mmHg  

 

 Heart Rate  120 /min  

 

 Body Temperature  98.7 F  

 

 Respiratory Rate  16 /min  

 

 Weight  128.00 lb  

 

 Weight Percentile  59th  









 2015  1:22pm  BP Systolic  120 mmHg  









 BP Diastolic  74 mmHg  

 

 Heart Rate  96 /min  

 

 Body Temperature  100.8 F  

 

 Respiratory Rate  16 /min  

 

 Height  63 inches  5'3"

 

 Weight  127.00 lb  

 

 BMI (Body Mass Index)  22.5 kg/m2  

 

 Body Mass Index Percentile  65 %  

 

 Weight Percentile  57th  

 

 Height Percentile  32 %  









 10/12/2015  2:13pm  BP Systolic  100 mmHg  









 BP Diastolic  60 mmHg  

 

 Heart Rate  64 /min  

 

 Body Temperature  98.5 F  

 

 Respiratory Rate  16 /min  

 

 Height  63 inches  5'3"

 

 Weight  129.25 lb  

 

 BMI (Body Mass Index)  22.9 kg/m2  

 

 Body Mass Index Percentile  69 %  

 

 Weight Percentile  61st  

 

 Height Percentile  32 %  









 2015  2:54pm  BP Systolic  104 mmHg  









 BP Diastolic  60 mmHg  

 

 Heart Rate  84 /min  

 

 Body Temperature  98.6 F  

 

 Respiratory Rate  16 /min  

 

 Height  63 inches  5'3"

 

 Weight  125.00 lb  

 

 BMI (Body Mass Index)  22.1 kg/m2  

 

 Body Mass Index Percentile  62 %  

 

 Weight Percentile  54th  

 

 Height Percentile  32 %  









 2015  5:59pm  BP Systolic  116 mmHg  









 BP Diastolic  54 mmHg  

 

 Heart Rate  90 /min  

 

 Body Temperature  99.1 F  

 

 Respiratory Rate  16 /min  

 

 Height  63 inches  5'3"

 

 Weight  124.12 lb  

 

 BMI (Body Mass Index)  22.0 kg/m2  

 

 Body Mass Index Percentile  60 %  

 

 Weight Percentile  52nd  

 

 Height Percentile  32 %  









 2015  3:21pm  BP Systolic  110 mmHg  









 BP Diastolic  60 mmHg  

 

 Heart Rate  88 /min  

 

 Body Temperature  99.0 F  

 

 Respiratory Rate  16 /min  

 

 Height  63 inches  5'3"

 

 Height Percentile  32 %  









 2015 11:28am  BP Systolic  98 mmHg  









 BP Diastolic  50 mmHg  

 

 Heart Rate  80 /min  

 

 Body Temperature  100.4 F  

 

 Respiratory Rate  16 /min  

 

 Height  63 inches  5'3"

 

 Weight  122.00 lb  

 

 BMI (Body Mass Index)  21.6 kg/m2  

 

 Body Mass Index Percentile  56 %  

 

 Weight Percentile  48th  

 

 Height Percentile  32 %  









 2015 10:03am  BP Systolic  96 mmHg  









 BP Diastolic  50 mmHg  

 

 Heart Rate  84 /min  

 

 Body Temperature  99.0 F  

 

 Respiratory Rate  16 /min  

 

 Height  63 inches  5'3"

 

 Weight  118.25 lb  

 

 BMI (Body Mass Index)  20.9 kg/m2  

 

 Body Mass Index Percentile  48 %  

 

 Weight Percentile  41st  

 

 Height Percentile  32 %  









 2015  3:08pm  BP Systolic  104 mmHg  









 BP Diastolic  60 mmHg  

 

 Heart Rate  84 /min  

 

 Body Temperature  99.2 F  

 

 Respiratory Rate  16 /min  

 

 Height  63 inches  5'3"

 

 Weight  121.38 lb  

 

 BMI (Body Mass Index)  21.5 kg/m2  

 

 Body Mass Index Percentile  55 %  

 

 Weight Percentile  47th  

 

 Height Percentile  32 %  









 2015  1:45pm  BP Systolic  90 mmHg  









 BP Diastolic  60 mmHg  

 

 Heart Rate  84 /min  

 

 Body Temperature  98.2 F  

 

 Respiratory Rate  12 /min  

 

 Height  63 inches  5'3"

 

 Weight  117.38 lb  

 

 BMI (Body Mass Index)  20.8 kg/m2  

 

 Body Mass Index Percentile  47 %  

 

 Weight Percentile  39th  

 

 Height Percentile  32 %  









 2015  4:47pm  BP Systolic  90 mmHg  









 BP Diastolic  54 mmHg  

 

 Heart Rate  84 /min  

 

 Body Temperature  99.3 F  

 

 Respiratory Rate  16 /min  

 

 Weight  121.00 lb  

 

 Weight Percentile  47th  









 2015  2:35pm  BP Systolic  96 mmHg  









 BP Diastolic  68 mmHg  

 

 Heart Rate  90 /min  

 

 Body Temperature  99.0 F  

 

 Respiratory Rate  16 /min  

 

 Weight  125.00 lb  

 

 Weight Percentile  55th  









 2015 12:02pm  BP Systolic  100 mmHg  









 BP Diastolic  60 mmHg  

 

 Heart Rate  84 /min  

 

 Body Temperature  98.7 F  

 

 Respiratory Rate  16 /min  

 

 Weight  127.12 lb  

 

 Weight Percentile  59th  









 2015  2:45pm  BP Systolic  122 mmHg  









 BP Diastolic  60 mmHg  

 

 Heart Rate  84 /min  

 

 Body Temperature  98.9 F  

 

 Respiratory Rate  16 /min  

 

 Weight  130.12 lb  

 

 Weight Percentile  64th  









 2015  9:54am  BP Systolic  124 mmHg  









 BP Diastolic  60 mmHg  

 

 Heart Rate  90 /min  

 

 Body Temperature  99.5 F  

 

 Respiratory Rate  16 /min  

 

 Height  63 inches  5'3" measured 4/9/15

 

 Weight  130.38 lb  

 

 BMI (Body Mass Index)  23.1 kg/m2  

 

 Body Mass Index Percentile  72 %  

 

 Weight Percentile  65th  

 

 Height Percentile  32 %  









 2015  2:38pm  BP Systolic  122 mmHg  









 BP Diastolic  60 mmHg  

 

 Heart Rate  84 /min  

 

 Body Temperature  99.1 F  

 

 Respiratory Rate  16 /min  

 

 Height  63 inches  measured 4/9/15

 

 Weight  128.50 lb  

 

 BMI (Body Mass Index)  22.8 kg/m2  

 

 Body Mass Index Percentile  69 %  

 

 Weight Percentile  62nd  

 

 Height Percentile  33 %  







Results







 Test  Date  Facility  Test  Result  H/L  Range  Note

 

 Laboratory test  2019  [facility] [place]  Clotest  SEE RESULT      1, 2



 finding        BELOW      

 

 Laboratory test  2019  [facility] [place]  Surgical  SEE RESULT      3, 4



 finding      Interface Order  BELOW      

 

 Laboratory test  2018  [facility]  Quickstrep  NEGATIVE    Negative
  



 finding    (607)-   -          

 

 CBC Auto Diff  2018  [facility]  White Blood  6.7 10^3/uL  N  3.5-10.8  5



       Count        









 Red Blood Count  4.64 10^6/uL  N  4.0-5.4  

 

 Hemoglobin  14.2 g/dL  N  12.0-16.0  

 

 Hematocrit  42 %  N  35-47  

 

 Mean Corpuscular Volume  90 fL  N  80-97  

 

 Mean Corpuscular Hemoglobin  31 pg  N  27-31  

 

 Mean Corpuscular HGB Conc  34 g/dL  N  31-36  

 

 Red Cell Distribution Width  13 %  N  10.5-15  

 

 Platelet Count  257 10^3/uL  N  150-450  

 

 Mean Platelet Volume  9.6 um3  N  7.4-10.4  

 

 Abs Neutrophils  4.3 10^3/uL  N  1.5-7.7  

 

 Abs Lymphocytes  1.8 10^3/uL  N  1.0-4.8  

 

 Abs Monocytes  0.5 10^3/uL  N  0-0.8  

 

 Abs Eosinophils  0 10^3/uL  N  0-0.6  

 

 Abs Basophils  0.1 10^3/uL  N  0-0.2  

 

 Abs Nucleated RBC  0 10^3/uL      

 

 Granulocyte %  64.0 %  N  38-83  

 

 Lymphocyte %  27.0 %  N  25-47  

 

 Monocyte %  7.5 %  High  0-7  

 

 Eosinophil %  0.5 %  N  0-6  

 

 Basophil %  1.0 %  N  0-2  

 

 Nucleated Red Blood Cells %  0      









 Comp Metabolic Panel  2018  [facility]  Sodium  138 mmol/L  Low  139-145  









 Potassium  4.0 mmol/L  N  3.5-5.0  

 

 Chloride  107 mmol/L  N  101-111  

 

 Co2 Carbon Dioxide  24 mmol/L  N  22-32  

 

 Anion Gap  7 mmol/L  N  2-11  

 

 Glucose  78 mg/dL  N    

 

 Blood Urea Nitrogen  6 mg/dL  N  6-24  

 

 Creatinine  0.66 mg/dL  N  0.51-0.95  

 

 BUN/Creatinine Ratio  9.1  N  8-20  

 

 Calcium  9.6 mg/dL  N  8.6-10.3  

 

 Total Protein  6.6 g/dL  N  6.4-8.9  

 

 Albumin  4.2 g/dL  N  3.2-5.2  

 

 Globulin  2.4 g/dL  N  2-4  

 

 Albumin/Globulin Ratio  1.8  N  1-3  

 

 Total Bilirubin  0.30 mg/dL  N  0.2-1.0  

 

 Alkaline Phosphatase  62 U/L  N    

 

 Alt  18 U/L  N  7-52  

 

 Ast  15 U/L  N  13-39  

 

 Egfr Non-  114.2    >60  

 

 Egfr   146.8    >60  6









 Laboratory test finding  2018  [facility]  Magnesium  2.0 mg/dL  N  1.9-2.7  7









 HCG Pregnancy  < 0.60 mIU/mL      8

 

 TSH (Thyroid Stim Horm)  1.28 mcIU/mL  N  0.34-5.60  9









 Laboratory test  2018  [facility]  Poc Pregnancy,  Negative    Negative  10



 finding      Urine        

 

 Laboratory test  2018  [facility]  Tryptase  2.5 ng/mL    <11.5  11



 finding              

 

 Laboratory test  2018  [facility]  Urine Culture And  SEE RESULT      12



 finding      Sensitivities  BELOW      

 

 Ua - Micro ([facility])  2018  [facility]  Appearance  slightly  #    



     (607)-   -    cloudy      









 Color  yellow      

 

 Glucose, Urine ([facility])  neg      

 

 Bilirubin  neg      

 

 Ketones  neg      

 

 SP Grav  >=1.030      

 

 Blood  Trace-lysed  #    

 

 PH  6.0      

 

 Protein  trace ssa  #    

 

 Urobil  0.2      

 

 Nitrite  Negative      

 

 Leukocytes ([facility])  sm  #    

 

 Hyaline  - /Lpf      

 

 Granular  - /Lpf      

 

 WBC ([facility])  50-60  #    

 

 RBC  3-5  #    

 

 Mucus (Fma/CBC/[facility])  - /Lpf      

 

 Epith  few /Lpf  #    

 

 Bacteria  3+ /Hpf  #    

 

 Amorphous ([facility])  - /Lpf      

 

 Crystals, Fluid ([facility])  -      

 

 Z#Comments  -      









 Ua - Micro ([facility]ma)  2017  [facility]  Appearance  slightly cloudy    
  



     (607)-   -          









 Color  yellow      

 

 Glucose, Urine ([facility])  neg      

 

 Bilirubin  neg      

 

 Ketones  neg      

 

 SP Grav  1.010      

 

 Blood  trace-intact  #    

 

 PH  6.0      

 

 Protein  neg      

 

 Urobil  0.2      

 

 Nitrite  neg      

 

 Leukocytes ([facility])  small  #    

 

 WBC ([facility])  7-8  #    

 

 RBC  0-2  #    

 

 Epith  rare /Lpf  #    

 

 Bacteria  2+ /Hpf  #    









 Lyme, Western Blot,  2017  [facility]  IgG P93 Ab.  Absent      13



 Serum    [address] [place] [address]          



     (607)-   -          









 IgG P66 Ab.  Absent      

 

 IgG P58 Ab.  Absent      

 

 IgG P45 Ab.  Absent      

 

 IgG P41 Ab.  Absent      

 

 IgG P39 Ab.  Absent      

 

 IgG P30 Ab.  Absent      

 

 IgG P28 Ab.  Absent      

 

 IgG P23 Ab.  Absent      

 

 IgG P18 Ab.  Absent      

 

 Lyme IgG WB Interp.  Negative      14

 

 IgM P41 Ab.  Absent      

 

 IgM P39 Ab.  Absent      

 

 IgM P23 Ab.  Present  Abnormal    

 

 Lyme IgM WB Interp.  Negative      15









 CBC Auto Diff  2017  [facility]  White Blood Count  7.4 10^3/uL  N  3.5-10.8  









 Red Blood Count  4.74 10^6/uL  N  4.0-5.4  

 

 Hemoglobin  14.3 g/dL  N  12.0-16.0  

 

 Hematocrit  42 %  N  35-47  

 

 Mean Corpuscular Volume  89 fL  N  80-97  

 

 Mean Corpuscular Hemoglobin  30 pg  N  27-31  

 

 Mean Corpuscular HGB Conc  34 g/dL  N  31-36  

 

 Red Cell Distribution Width  12 %  N  10.5-15  

 

 Platelet Count  278 10^3/uL  N  150-450  

 

 Mean Platelet Volume  9 um3  N  7.4-10.4  

 

 Abs Neutrophils  3.6 10^3/uL  N  1.5-7.7  

 

 Abs Lymphocytes  2.8 10^3/uL  N  1.0-4.8  

 

 Abs Monocytes  0.8 10^3/uL  N  0-0.8  

 

 Abs Eosinophils  0.1 10^3/uL  N  0-0.6  

 

 Abs Basophils  0.1 10^3/uL  N  0-0.2  

 

 Abs Nucleated RBC  0 10^3/uL  N    

 

 Granulocyte %  49.2 %  N  38-83  

 

 Lymphocyte %  37.8 %  N  25-47  

 

 Monocyte %  11.1 %  High  1-9  

 

 Eosinophil %  1.0 %  N  0-6  

 

 Basophil %  0.9 %  N  0-2  

 

 Nucleated Red Blood Cells %  0.1  N    









 Laboratory test finding  2017  [facility]  Lactic Acid  1.2 mmol/L  N  0.5-2.0  
16

 

 Laboratory test finding  2017  [facility]  Magnesium  2.3 mg/dL  N  1.9-2.7  









 Troponin I  0.00 ng/mL  N  <0.04  17









 Comp Metabolic Panel  2017  [facility]  Sodium  137 mmol/L  N  133-145  









 Potassium  3.5 mmol/L  N  3.5-5.0  

 

 Chloride  106 mmol/L  N  101-111  

 

 Co2 Carbon Dioxide  27 mmol/L  N  22-32  

 

 Anion Gap  4 mmol/L  N  2-11  

 

 Glucose  71 mg/dL  N    

 

 Blood Urea Nitrogen  7 mg/dL  N  6-24  

 

 Creatinine  0.71 mg/dL  N  0.51-0.95  

 

 BUN/Creatinine Ratio  9.9  N  8-20  

 

 Calcium  9.3 mg/dL  N  8.6-10.3  

 

 Total Protein  7.1 g/dL  N  6.4-8.9  

 

 Albumin  4.2 g/dL  N  3.2-5.2  

 

 Globulin  2.9 g/dL  N  2-4  

 

 Albumin/Globulin Ratio  1.4  N  1-3  

 

 Total Bilirubin  0.20 mg/dL  N  0.2-1.0  

 

 Alkaline Phosphatase  59 U/L  N    

 

 Alt  12 U/L  N  7-52  

 

 Ast  11 U/L  Low  13-39  

 

 Egfr Non-  107.2  N  >60  

 

 Egfr   137.9  N  >60  18









 Laboratory test finding  10/18/2016  [facility]  TSH  0.88 mIU/L    0.50-
6.00  









 Free T4  1.32 ng/dL    0.75-1.54  

 

 Free T3  3.98 pg/mL    2.00-4.90  









 Laboratory test  2016  [facility]  HCG Pregnancy  < 0.60 mIU/mL  N    19



 finding              

 

 Laboratory test  2016  [facility]  TSH (Thyroid Stim  0.44 mcIU/mL  N  0.34-
5.60  



 finding      Horm)        









 T3 Free  5.10 pg/mL  High  2.5-3.9  

 

 Free T4 (Free Thyroxine)  0.91 ng/dL  N  0.61-1.12  









 Liver Function Panel  2016  [facility]  Direct Bilirubin  0.10 mg/dL  N  0.03-
0.18  









 Indirect Bilirubin  0.4 mg/dL  N  0.3-1.0  









 Comp Metabolic Panel  2016  [facility]  Sodium  136 mmol/L  N  133-145  









 Potassium  4.1 mmol/L  N  3.5-5.0  

 

 Chloride  104 mmol/L  N  101-111  

 

 Co2 Carbon Dioxide  25 mmol/L  N  22-32  

 

 Anion Gap  7 mmol/L  N  2-11  

 

 Glucose  108 mg/dL  High    

 

 Blood Urea Nitrogen  10 mg/dL  N  6-24  

 

 Creatinine  0.71 mg/dL  N  0.51-0.95  

 

 BUN/Creatinine Ratio  14.1  N  8-20  

 

 Calcium  9.7 mg/dL  N  8.6-10.3  

 

 Total Protein  6.8 g/dL  N  6.4-8.9  

 

 Albumin  4.4 g/dL  N  3.2-5.2  

 

 Globulin  2.4 g/dL  N  2-4  

 

 Albumin/Globulin Ratio  1.8  N  1-3  

 

 Total Bilirubin  0.50 mg/dL  N  0.2-1.0  

 

 Alkaline Phosphatase  62 U/L  N    

 

 Alt  19 U/L  N  7-52  

 

 Ast  17 U/L  N  13-39  

 

 Egfr Non-  107.2  N  >60  

 

 Egfr   137.9  N  >60  20









 CBC Auto Diff  2016  [facility]  White Blood Count  7.9 10^3/uL  N  3.5-10.8  









 Red Blood Count  4.72 10^6/uL  N  4.0-5.4  

 

 Hemoglobin  14.3 g/dL  N  12.0-16.0  

 

 Hematocrit  42 %  N  35-47  

 

 Mean Corpuscular Volume  90 fL  N  80-97  

 

 Mean Corpuscular Hemoglobin  30 pg  N  27-31  

 

 Mean Corpuscular HGB Conc  34 g/dL  N  31-36  

 

 Red Cell Distribution Width  13 %  N  10.5-15  

 

 Platelet Count  244 10^3/uL  N  150-450  

 

 Mean Platelet Volume  9 um3  N  7.4-10.4  

 

 Abs Neutrophils  5.8 10^3/uL  N  1.5-7.7  

 

 Abs Lymphocytes  1.7 10^3/uL  N  1.0-4.8  

 

 Abs Monocytes  0.3 10^3/uL  N  0-0.8  

 

 Abs Eosinophils  0 10^3/uL  N  0-0.6  

 

 Abs Basophils  0 10^3/uL  N  0-0.2  

 

 Abs Nucleated RBC  0 10^3/uL  N    

 

 Granulocyte %  73.5 %  N  38-83  

 

 Lymphocyte %  21.3 %  Low  25-47  

 

 Monocyte %  4.0 %  N  1-9  

 

 Eosinophil %  0.6 %  N  0-6  

 

 Basophil %  0.6 %  N  0-2  

 

 Nucleated Red Blood Cells %  0.1  N    









 Laboratory test  2016  [facility]  Pregnancy,  neg      



 finding    (607)-   -  Serum        

 

 Laboratory test  2016  [facility]  RPR  Non Reactive    Non Reactive  21



 finding    [address] [place] [address]          



     (607)-   -          

 

 HIV 1/O/2 Ag/AB  2016  [facility]  HIV Screen 4th  Non Reactive    Non 
Reactive  



 Prelim    [address] [facility]  [address]        



 [address] [place] [address]          



 Sup    (607)-   -          

 

 Laboratory test  2016  [facility]  HCV Antibody  <0.1    0.0-0.9  22



 finding    [address] [facility]    s/coratio      



     [place] [address]          



     (607)-   -          

 

 Urinalysis  2016  [facility]  Urine Color  Straw  N    



 Profile              









 Urine Appearance  Cloudy  N    

 

 Urine Specific Gravity  1.003  Low  1.010-1.030  

 

 Urine pH  6.0  N  5-9  

 

 Urine Urobilinogen  Negative  N  Negative  

 

 Urine Ketones  Negative  N  Negative  

 

 Urine Protein  Negative  N  Negative  

 

 Urine Leukocytes  3+  Abnormal  Negative  

 

 Urine Blood  Negative  N  Negative  

 

 Urine Nitrite  Negative  N  Negative  

 

 Urine Bilirubin  Negative  N  Negative  

 

 Urine Glucose  Negative  N  Negative  

 

 Urine White Blood Cell  1+(6-10/hpf)  Abnormal  Absent  

 

 Urine Red Blood Cell  Absent  N  Absent  

 

 Urine Bacteria  1+  Abnormal  Absent  

 

 Urine Squamous Epithelial Cell  Present  Abnormal  Absent  

 

 Urine Transitional Epithelial  Present  Abnormal  Absent  









 Laboratory test  2016  [facility]  Urine Culture And  SEE RESULT BELOW      23



 finding      Sensitivities        

 

 Laboratory test  2016  [facility]  Partial Thrombo Time  28.5 seconds  N  26.0-
36.  



 finding      PTT      3  









 D Dimer Quantitative  < 200 ng/mL  N  Less Than 230  24









 CBC Auto Diff  2016  [facility]  White Blood Count  6.7 10^3/uL  N  3.5-10.8  









 Red Blood Count  4.63 10^6/uL  N  4.0-5.4  

 

 Hemoglobin  13.9 g/dL  N  12.0-16.0  

 

 Hematocrit  41 %  N  35-47  

 

 Mean Corpuscular Volume  89 fL  N  80-97  

 

 Mean Corpuscular Hemoglobin  30 pg  N  27-31  

 

 Mean Corpuscular HGB Conc  34 g/dL  N  31-36  

 

 Red Cell Distribution Width  13 %  N  10.5-15  

 

 Platelet Count  295 10^3/uL  N  150-450  

 

 Mean Platelet Volume  9 um3  N  7.4-10.4  

 

 Abs Neutrophils  3.7 10^3/uL  N  1.5-7.7  

 

 Abs Lymphocytes  2.2 10^3/uL  N  1.0-4.8  

 

 Abs Monocytes  0.5 10^3/uL  N  0-0.8  

 

 Abs Eosinophils  0.1 10^3/uL  N  0-0.6  

 

 Abs Basophils  0.2 10^3/uL  N  0-0.2  

 

 Abs Nucleated RBC  0 10^3/uL  N    

 

 Granulocyte %  55.4 %  N  38-83  

 

 Lymphocyte %  32.9 %  N  25-47  

 

 Monocyte %  7.4 %  N  1-9  

 

 Eosinophil %  1.2 %  N  0-6  

 

 Basophil %  3.1 %  High  0-2  

 

 Nucleated Red Blood Cells %  0.1  N    









 Comp Metabolic Panel  2016  [facility]  Sodium  136 mmol/L  N  133-145  









 Potassium  3.8 mmol/L  N  3.5-5.0  

 

 Chloride  104 mmol/L  N  101-111  

 

 Co2 Carbon Dioxide  25 mmol/L  N  22-32  

 

 Anion Gap  7 mmol/L  N  2-11  

 

 Glucose  108 mg/dL  High    

 

 Blood Urea Nitrogen  8 mg/dL  N  6-24  

 

 Creatinine  0.71 mg/dL  N  0.51-0.95  

 

 BUN/Creatinine Ratio  11.3  N  8-20  

 

 Calcium  9.8 mg/dL  N  8.6-10.3  

 

 Total Protein  7.3 g/dL  N  6.4-8.9  

 

 Albumin  4.4 g/dL  N  3.2-5.2  

 

 Globulin  2.9 g/dL  N  2-4  

 

 Albumin/Globulin Ratio  1.5  N  1-3  

 

 Total Bilirubin  0.20 mg/dL  N  0.2-1.0  

 

 Alkaline Phosphatase  52 U/L  N    

 

 Alt  17 U/L  N  7-52  

 

 Ast  14 U/L  N  13-39  

 

 Egfr Non-  107.2  N  >60  

 

 Egfr   137.9  N  >60  25









 Laboratory test finding  2016  [facility]  Magnesium  2.3 mg/dL  N  1.9-2.7  









 Lipase  17 U/L  N  11.0-82.0  

 

 C Reactive Protein  1.15 mg/L  N  < 5.00  26

 

 Troponin I  0.00 ng/mL  N  <0.03  27









 CKMB  2016  [facility]  CKMB  ng/mL  0.8 ng/mL  N  0.6-6.3  

 

 Laboratory test finding  2016  [facility]  HCG Pregnancy  < 0.60 mIU/mL  N    28









 TSH (Thyroid Stim Horm)  1.44 ?IU/mL  N  0.34-5.60  

 

 B Type Natriuretic Peptide  27 pg/mL  N    29

 

 Lactic Acid  2.2 mmol/L  High  0.5-2.0  30









 No Test Indicated  2016  [facility]  .  See Comment:      31, 32



     [address] [place] [address]          



     (206)-   -          









 Dear Doctor,  See Comment:      33









 Laboratory test  2016  [facility]  No Frozen Received  TNP      34



 finding    [address] [place] [address]          



     (966)-   -          









 Angiotensin-Converting Enzyme  35 U/L    14-82  

 

 Written Authorization  See Comment:      35









 Laboratory test finding  2016  [facility]  Erythrocyte Sed Rate  7 mm/Hr  N  0-
14  

 

 CBC Auto Diff  2016  [facility]  White Blood Count  8.5 10^3/uL  N  3.5-10.8  









 Red Blood Count  5.23 10^6/uL  N  4.0-5.4  

 

 Hemoglobin  15.2 g/dL  N  12.0-16.0  

 

 Hematocrit  47 %  N  35-47  

 

 Mean Corpuscular Volume  89 fL  N  80-97  

 

 Mean Corpuscular Hemoglobin  29 pg  N  27-31  

 

 Mean Corpuscular HGB Conc  33 g/dL  N  31-36  

 

 Red Cell Distribution Width  13 %  N  10.5-15  

 

 Platelet Count  273 10^3/uL  N  150-450  

 

 Mean Platelet Volume  9 um3  N  7.4-10.4  

 

 Abs Neutrophils  5.4 10^3/uL  N  1.5-7.7  

 

 Abs Lymphocytes  2.3 10^3/uL  N  1.0-4.8  

 

 Abs Monocytes  0.5 10^3/uL  N  0-0.8  

 

 Abs Eosinophils  0.3 10^3/uL  N  0-0.6  

 

 Abs Basophils  0 10^3/uL  N  0-0.2  

 

 Abs Nucleated RBC  0 10^3/uL  N    

 

 Granulocyte %  63.6 %  N  38-83  

 

 Lymphocyte %  26.7 %  N  25-47  

 

 Monocyte %  5.7 %  N  1-9  

 

 Eosinophil %  3.4 %  N  0-6  

 

 Basophil %  0.6 %  N  0-2  

 

 Nucleated Red Blood Cells %  0  N    









 Laboratory test finding  2016  [facility]  Inr/Protime  0.97  N  0.89-1.11  









 Partial Thrombo Time PTT  30.7 seconds  N  26.0-36.3  









 Comp Metabolic Panel  2016  [facility]  Sodium  136 mmol/L  N  133-145  









 Potassium  3.8 mmol/L  N  3.5-5.0  

 

 Chloride  104 mmol/L  N  101-111  

 

 Co2 Carbon Dioxide  26 mmol/L  N  22-32  

 

 Anion Gap  6 mmol/L  N  2-11  

 

 Glucose  92 mg/dL  N    

 

 Blood Urea Nitrogen  9 mg/dL  N  6-24  

 

 Creatinine  0.77 mg/dL  N  0.51-0.95  

 

 BUN/Creatinine Ratio  11.7  N  8-20  

 

 Calcium  9.7 mg/dL  N  8.6-10.3  

 

 Total Protein  7.7 g/dL  N  6.4-8.9  

 

 Albumin  4.8 g/dL  N  3.2-5.2  

 

 Globulin  2.9 g/dL  N  2-4  

 

 Albumin/Globulin Ratio  1.7  N  1-3  

 

 Total Bilirubin  0.30 mg/dL  N  0.2-1.0  

 

 Alkaline Phosphatase  64 U/L  N    

 

 Alt  22 U/L  N  7-52  

 

 Ast  16 U/L  N  13-39  

 

 Egfr Non-  97.6  N  >60  

 

 Egfr   125.6  N  >60  36









 Laboratory test finding  2016  [facility]  HCG Pregnancy  < 0.60 mIU/mL  N    37









 TSH (Thyroid Stim Horm)  1.20 ?IU/mL  N  0.34-5.60  









 CKMB  2016  [facility]  CKMB  ng/mL  0.5 ng/mL  Low  0.6-6.3  

 

 Laboratory test finding  2016  [facility]  Magnesium  2.2 mg/dL  N  1.9-2.7  









 C Reactive Protein  < 1.00 mg/L  N  < 5.00  38

 

 Troponin I  0.00 ng/mL  N  <0.03  39









 Basic Metabolic Panel  2016  [facility]  Sodium  140 mmol/L  N  133-145  









 Potassium  4.1 mmol/L  N  3.5-5.0  

 

 Chloride  105 mmol/L  N  101-111  

 

 Co2 Carbon Dioxide  28 mmol/L  N  22-32  

 

 Anion Gap  7 mmol/L  N  2-11  

 

 Glucose  95 mg/dL  N    

 

 Blood Urea Nitrogen  8 mg/dL  N  6-24  

 

 Creatinine  0.66 mg/dL  N  0.51-0.95  

 

 BUN/Creatinine Ratio  12.1  N  8-20  

 

 Calcium  9.5 mg/dL  N  8.6-10.3  

 

 Egfr Non-  116.6  N  >60  

 

 Egfr   150.0  N  >60  40









 Complete Blood Count  2016  [facility]  WBC  5.7 x10^3/UL    3.6-9.6
  









 RBC  4.98 x10^6/UL    3.90-5.70  

 

 HGB  14.6 g/dL    12.1-17.2  

 

 HCT  44 %    36-50  

 

 MCV  88.0 fL    82.2-97.4  

 

 MCH  29.3 pg    27.6-33.3  

 

 MCHC  33.3 g/dL    33.0-35.5  

 

 RDW  13.7 %    11.6-13.7  

 

 PLT  267 x10^3/UL    150-400  

 

 MPV  8.2 fL    7.4-10.4  

 

 Gran #  3.8 x10^3/UL    1.5-7.2  

 

 Lymph#  1.7 x10^3/UL    0.7-4.9  

 

 Mono#  0.2 x10^3/UL    0.1-0.9  

 

 Gran %  65.3 %    42.2-75.2  

 

 Lymph %  31.2 %    20.5-51.1  

 

 Mono%  3.5 %    1.7-9.3  









 Comprehensive Metabolic Prof  2016  [facility]  Sodium  140 mEq/L    
134-149  









 Potassium  4.1 mEq/L    3.6-5.5  

 

 Chloride  105 mEq/L      

 

 Carbon Dioxide  28 mEq/L    21-32  

 

 Glucose  100 mg/dL      

 

 BUN  7 mg/dL    6-26  

 

 Creatinine  0.6 mg/dL    0.6-1.4  

 

 BUN/Creat Ratio  11.7 CALC    8.0-36.0  

 

 Calcium  10.1 mg/dL    8.6-10.2  

 

 Total Protein  7.5 g/dL    6.4-8.3  

 

 Albumin  4.9 g/dL    3.8-5.5  

 

 Globulin  2.6 g/dL    2.0-4.8  

 

 A/G Ratio  1.9 CALC    0.6-2.3  

 

 Alk. Phosphatase  81 U/L      

 

 Alt (SGPT)  23 U/L    7-35  

 

 Ast (Sgot)  27 U/L    5-34  

 

 Total Bilirubin  0.3 mg/dL    0.2-1.3  

 

 GFR Non-African American  >60 ml/min/1.73m^    >=60  

 

 GFR African American  >60 ml/min/1.73m^    >=60  









 Laboratory test finding  2016  [facility]  Saliva Cortisol  186 ng/dL  N  100-
750  41

 

 Laboratory test finding  2016  [facility]  Saliva Cortisol  131 ng/dL  N    42

 

 Laboratory test finding  2016  [facility]  Saliva Cortisol  240 ng/dL  N    43

 

 Laboratory test finding  2016  [facility]  Saliva Cortisol  <50 ng/dL  N  <100  
44

 

 Laboratory test finding  2016  [facility]  Rapid Strep A  SEE RESULT BELOW      
45









 Rapid Influenza A & B Antigen  SEE RESULT BELOW      46









 Comp Metabolic Panel  2016  [facility]  Sodium  137 mmol/L  N  133-145  









 Potassium  3.8 mmol/L  N  3.5-5.0  

 

 Chloride  105 mmol/L  N  101-111  

 

 Co2 Carbon Dioxide  23 mmol/L  N  22-32  

 

 Anion Gap  9 mmol/L  N  2-11  

 

 Glucose  128 mg/dL  High    

 

 Blood Urea Nitrogen  14 mg/dL  N  6-24  

 

 Creatinine  0.65 mg/dL  N  0.51-0.95  

 

 BUN/Creatinine Ratio  21.5  High  8-20  

 

 Calcium  8.9 mg/dL  N  8.6-10.3  

 

 Total Protein  6.7 g/dL  N  6.4-8.9  

 

 Albumin  4.2 g/dL  N  3.2-5.2  

 

 Globulin  2.5 g/dL  N  2-4  

 

 Albumin/Globulin Ratio  1.7  N  1-3  

 

 Total Bilirubin  0.30 mg/dL  N  0.2-1.0  

 

 Alkaline Phosphatase  80 U/L  N    

 

 Alt  22 U/L  N  7-52  

 

 Ast  17 U/L  N  13-39  

 

 Egfr Non-  118.7  N  >60  

 

 Egfr   152.7  N  >60  47









 Rapid Influenza A & B  2016  [facility]  Influenza A Molecular  NEGATIVE  N  
Negative  48



 Molecular              









 Influenza B Molecular  NEGATIVE  N  Negative  









 Laboratory test finding  2016  [facility]  Rapid Strep  Molecular  Negative  N  
Negative  49









 Throat Beta Strep Culture  SEE RESULT BELOW      50









 CBC Auto Diff  2016  [facility]  White Blood Count  12.3 10^3/uL  High  3.5-
10.8  









 Red Blood Count  5.06 10^6/uL  N  4.0-5.4  

 

 Hemoglobin  14.5 g/dL  N  12.0-16.0  

 

 Hematocrit  43 %  N  35-47  

 

 Mean Corpuscular Volume  86 fL  N  80-97  

 

 Mean Corpuscular Hemoglobin  29 pg  N  27-31  

 

 Mean Corpuscular HGB Conc  34 g/dL  N  31-36  

 

 Red Cell Distribution Width  13 %  N  10.5-15  

 

 Platelet Count  218 10^3/uL  N  150-450  

 

 Mean Platelet Volume  9 um3  N  7.4-10.4  

 

 Abs Neutrophils  11.1 10^3/uL  High  1.5-7.7  

 

 Abs Lymphocytes  0.5 10^3/uL  Low  1.0-4.8  

 

 Abs Monocytes  0.6 10^3/uL  N  0-0.8  

 

 Abs Eosinophils  0.1 10^3/uL  N  0-0.6  

 

 Abs Basophils  0 10^3/uL  N  0-0.2  

 

 Abs Nucleated RBC  0 10^3/uL  N    

 

 Granulocyte %  89.8 %  High  38-83  

 

 Lymphocyte %  4.1 %  Low  25-47  

 

 Monocyte %  4.7 %  N  1-9  

 

 Eosinophil %  1.2 %  N  0-6  

 

 Basophil %  0.2 %  N  0-2  

 

 Nucleated Red Blood Cells %  0  N    









 CBC Auto Diff  2016  [facility] [place]  White Blood Count  6.1 10^3/uL  N  3.5-10.8  









 Red Blood Count  4.88 10^6/uL  N  4.0-5.4  

 

 Hemoglobin  14.0 g/dL  N  12.0-16.0  

 

 Hematocrit  43 %  N  35-47  

 

 Mean Corpuscular Volume  87 fL  N  80-97  

 

 Mean Corpuscular Hemoglobin  29 pg  N  27-31  

 

 Mean Corpuscular HGB Conc  33 g/dL  N  31-36  

 

 Red Cell Distribution Width  12 %  N  10.5-15  

 

 Platelet Count  340 10^3/uL  N  150-450  

 

 Mean Platelet Volume  8 um3  N  7.4-10.4  

 

 Abs Neutrophils  3.1 10^3/uL  N  1.5-7.7  

 

 Abs Lymphocytes  2.2 10^3/uL  N  1.0-4.8  

 

 Abs Monocytes  0.5 10^3/uL  N  0-0.8  

 

 Abs Eosinophils  0.1 10^3/uL  N  0-0.6  

 

 Abs Basophils  0.1 10^3/uL  N  0-0.2  

 

 Abs Nucleated RBC  0 10^3/uL  N    

 

 Granulocyte %  51.7 %  N  38-83  

 

 Lymphocyte %  36.7 %  N  25-47  

 

 Monocyte %  8.9 %  N  1-9  

 

 Eosinophil %  1.1 %  N  0-6  

 

 Basophil %  1.6 %  N  0-2  

 

 Nucleated Red Blood Cells %  0  N    









 Laboratory test finding  2016  [facility]  Magnesium  2.1 mg/dL  N  1.9-2.7  









 Troponin I  0.00 ng/mL  N  <0.03  51

 

 HCG Pregnancy  < 0.60 mIU/mL  N    52

 

 TSH (Thyroid Stim Horm)  1.22 ?IU/mL  N  0.34-5.60  









 Inr/Protime  2016  [facility]  Inr  0.95  N  0.89-1.11  

 

 Laboratory test finding  2016  [facility]  Partial Thrombo  28.9 seconds  N  
26.0-36.3  



       Time PTT        









 B Type Natriuretic Peptide  44 pg/mL  N    53









 Comp Metabolic Panel  2016  [facility] [place]  Sodium  137 mmol/L  N  133-145  









 Potassium  3.8 mmol/L  N  3.5-5.0  

 

 Chloride  105 mmol/L  N  101-111  

 

 Co2 Carbon Dioxide  24 mmol/L  N  22-32  

 

 Anion Gap  8 mmol/L  N  2-11  

 

 Glucose  106 mg/dL  High    

 

 Blood Urea Nitrogen  12 mg/dL  N  6-24  

 

 Creatinine  0.64 mg/dL  N  0.51-0.95  

 

 BUN/Creatinine Ratio  18.8  N  8-20  

 

 Calcium  9.2 mg/dL  N  8.6-10.3  

 

 Total Protein  7.0 g/dL  N  6.4-8.9  

 

 Albumin  4.4 g/dL  N  3.2-5.2  

 

 Globulin  2.6 g/dL  N  2-4  

 

 Albumin/Globulin Ratio  1.7  N  1-3  

 

 Total Bilirubin  0.20 mg/dL  N  0.2-1.0  

 

 Alkaline Phosphatase  72 U/L  N    

 

 Alt  23 U/L  N  7-52  

 

 Ast  14 U/L  N  13-39  









 CBC Auto Diff  2015  [facility] [place]  White Blood Count  8.6 10^3/uL  N  3.5-10.8  









 Red Blood Count  5.18 10^6/uL  N  4.0-5.4  

 

 Hemoglobin  14.8 g/dL  N  12.0-16.0  

 

 Hematocrit  46 %  N  35-47  

 

 Mean Corpuscular Volume  89 fL  N  80-97  

 

 Mean Corpuscular Hemoglobin  29 pg  N  27-31  

 

 Mean Corpuscular HGB Conc  32 g/dL  N  31-36  

 

 Red Cell Distribution Width  13 %  N  10.5-15  

 

 Platelet Count  275 10^3/uL  N  150-450  

 

 Mean Platelet Volume  9 um3  N  7.4-10.4  

 

 Abs Neutrophils  6.0 10^3/uL  N  1.5-7.7  

 

 Abs Lymphocytes  1.8 10^3/uL  N  1.0-4.8  

 

 Abs Monocytes  0.7 10^3/uL  N  0-0.8  

 

 Abs Eosinophils  0.1 10^3/uL  N  0-0.6  

 

 Abs Basophils  0 10^3/uL  N  0-0.2  

 

 Abs Nucleated RBC  0 10^3/uL  N    

 

 Granulocyte %  69.7 %  N  38-83  

 

 Lymphocyte %  20.4 %  Low  25-47  

 

 Monocyte %  8.1 %  N  1-9  

 

 Eosinophil %  1.4 %  N  0-6  

 

 Basophil %  0.4 %  N  0-2  

 

 Nucleated Red Blood Cells %  0.1  N    









 Comp Metabolic Panel  2015  [facility] [place]  Sodium  135 mmol/L  N  133-145  









 Potassium  3.8 mmol/L  N  3.5-5.0  

 

 Chloride  104 mmol/L  N  101-111  

 

 Co2 Carbon Dioxide  22 mmol/L  N  22-32  

 

 Anion Gap  9 mmol/L  N  2-11  

 

 Glucose  101 mg/dL  High    

 

 Blood Urea Nitrogen  7 mg/dL  N  6-24  

 

 Creatinine  0.57 mg/dL  N  0.51-0.95  

 

 BUN/Creatinine Ratio  12.3  N  8-20  

 

 Calcium  10.0 mg/dL  N  8.6-10.3  

 

 Total Protein  7.7 g/dL  N  6.4-8.9  

 

 Albumin  4.3 g/dL  N  3.2-5.2  

 

 Globulin  3.4 g/dL  N  2-4  

 

 Albumin/Globulin Ratio  1.3  N  1-3  

 

 Total Bilirubin  0.20 mg/dL  N  0.2-1.0  

 

 Alkaline Phosphatase  68 U/L  N    

 

 Alt  37 U/L  N  7-52  

 

 Ast  31 U/L  N  13-39  









 Laboratory test finding  2015  [facility]  Troponin I  0.89 ng/mL  High  <0.03  
54









 Lactic Acid  1.9 mmol/L  N  0.5-2.0  55

 

 Blood Culture  SEE RESULT BELOW      56









 Basic Metabolic Panel  2015  [facility] [place]  Sodium  137 mmol/L  N  133-145  









 Potassium  4.1 mmol/L  N  3.5-5.0  

 

 Chloride  104 mmol/L  N  101-111  

 

 Co2 Carbon Dioxide  26 mmol/L  N  22-32  

 

 Anion Gap  7 mmol/L  N  2-11  

 

 Glucose  99 mg/dL  N    

 

 Blood Urea Nitrogen  5 mg/dL  Low  6-24  

 

 Creatinine  0.72 mg/dL  N  0.51-0.95  

 

 BUN/Creatinine Ratio  6.9  Low  8-20  

 

 Calcium  9.3 mg/dL  N  8.6-10.3  









 Basic Metabolic Panel  2015  [facility]  Sodium  137 mmol/L  N  133-145  









 Potassium  4.1 mmol/L  N  3.5-5.0  

 

 Chloride  104 mmol/L  N  101-111  

 

 Co2 Carbon Dioxide  26 mmol/L  N  22-32  

 

 Anion Gap  7 mmol/L  N  2-11  

 

 Glucose  99 mg/dL  N    

 

 Blood Urea Nitrogen  5 mg/dL  Low  6-24  

 

 Creatinine  0.72 mg/dL  N  0.51-0.95  

 

 BUN/Creatinine Ratio  6.9  Low  8-20  

 

 Calcium  9.3 mg/dL  N  8.6-10.3  









 Comp Metabolic-ALL Lab Compani  2015  [facility]  Sodium  136 mmol/L  N  133-
145  









 Potassium  4.2 mmol/L  N  3.5-5.0  

 

 Chloride  103 mmol/L  N  101-111  

 

 Co2 Carbon Dioxide  27 mmol/L  N  22-32  

 

 Anion Gap  6 mmol/L  N  2-11  

 

 Glucose  100 mg/dL  N    

 

 Blood Urea Nitrogen  5 mg/dL  Low  6-24  

 

 Creatinine  0.69 mg/dL  N  0.51-0.95  

 

 BUN/Creatinine Ratio  7.2  Low  8-20  

 

 Calcium  9.5 mg/dL  N  8.6-10.3  

 

 Total Protein  6.9 g/dL  N  6.4-8.9  

 

 Albumin  4.3 g/dL  N  3.2-5.2  

 

 Globulin  2.6 g/dL  N  2-4  

 

 Albumin/Globulin Ratio  1.7  N  1-3  

 

 Total Bilirubin  0.20 mg/dL  N  0.2-1.0  

 

 Alkaline Phosphatase  73 U/L  N    

 

 Alt  20 U/L  N  7-52  

 

 Ast  18 U/L  N  13-39  









 Laboratory test finding  2015  [facility] [place]  Creatine Kinase  28 U/L  N    









 Vitamin B12  246 pg/mL  N  180-914  57

 

 Vitamin D Total 25(Oh)  26.0 ng/mL  Low  30-50  

 

 Camille (Antinuclear Antibodies)  Negative  N  Negative  









 CBC Auto Diff  2015  [facility]  White Blood Count  4.8 10^3/uL  N  3.5-10.8  









 Red Blood Count  5.11 10^6/uL  N  4.0-5.4  

 

 Hemoglobin  14.9 g/dL  N  12.0-16.0  

 

 Hematocrit  46 %  N  35-47  

 

 Mean Corpuscular Volume  89 fL  N  80-97  

 

 Mean Corpuscular Hemoglobin  29 pg  N  27-31  

 

 Mean Corpuscular HGB Conc  33 g/dL  N  31-36  

 

 Red Cell Distribution Width  12 %  N  10.5-15  

 

 Platelet Count  206 10^3/uL  N  150-450  

 

 Mean Platelet Volume  9 um3  N  7.4-10.4  

 

 Abs Neutrophils  2.8 10^3/uL  N  1.5-7.7  

 

 Abs Lymphocytes  1.4 10^3/uL  N  1.0-4.8  

 

 Abs Monocytes  0.4 10^3/uL  N  0-0.8  

 

 Abs Eosinophils  0.1 10^3/uL  N  0-0.6  

 

 Abs Basophils  0 10^3/uL  N  0-0.2  

 

 Abs Nucleated RBC  0 10^3/uL  N    

 

 Granulocyte %  59.7 %  N  38-83  

 

 Lymphocyte %  29.1 %  N  25-47  

 

 Monocyte %  8.8 %  N  1-9  

 

 Eosinophil %  2.0 %  N  0-6  

 

 Basophil %  0.4 %  N  0-2  

 

 Nucleated Red Blood Cells %  0  N    









 CBC Electronic-ALL Lab  2015  [facility]  White Blood Count  4.8 10^3/uL  N  3.5
-10.8  



 Compani              









 Red Blood Count  5.11 10^6/uL  N  4.0-5.4  

 

 Hemoglobin  14.9 g/dL  N  12.0-16.0  

 

 Hematocrit  46 %  N  35-47  

 

 Mean Corpuscular Volume  89 fL  N  80-97  

 

 Mean Corpuscular Hemoglobin  29 pg  N  27-31  

 

 Mean Corpuscular HGB Conc  33 g/dL  N  31-36  

 

 Red Cell Distribution Width  12 %  N  10.5-15  

 

 Platelet Count  206 10^3/uL  N  150-450  

 

 Mean Platelet Volume  9 um3  N  7.4-10.4  

 

 Abs Neutrophils  2.8 10^3/uL  N  1.5-7.7  

 

 Abs Lymphocytes  1.4 10^3/uL  N  1.0-4.8  

 

 Abs Monocytes  0.4 10^3/uL  N  0-0.8  

 

 Abs Eosinophils  0.1 10^3/uL  N  0-0.6  

 

 Abs Basophils  0 10^3/uL  N  0-0.2  

 

 Abs Nucleated RBC  0 10^3/uL  N    

 

 Granulocyte %  59.7 %  N  38-83  

 

 Lymphocyte %  29.1 %  N  25-47  

 

 Monocyte %  8.8 %  N  1-9  

 

 Eosinophil %  2.0 %  N  0-6  

 

 Basophil %  0.4 %  N  0-2  

 

 Nucleated Red Blood Cells %  0  N    









 Laboratory test finding  2015  [facility]  Free T4 (Free  0.88 ng/mL  N  0.61-
1.12  



       Thyroxine)        









 TSH (Thyroid Stim Horm)  1.45 ?IU/mL  N  0.34-5.60  

 

 C Reactive Protein  25.62 mg/L  High  < 5.00  58









 CBC Auto Diff  10/27/2015  [facility]  White Blood Count  6.7 10^3/uL  N  4.8-10.8  









 Red Blood Count  4.62 10^6/uL  N  4.0-5.4  

 

 Hemoglobin  14.0 g/dL  N  12.0-16.0  

 

 Hematocrit  42 %  N  35-47  

 

 Mean Corpuscular Volume  91 fL  N  80-97  

 

 Mean Corpuscular Hemoglobin  30 pg  N  27-31  

 

 Mean Corpuscular HGB Conc  33 g/dL  N  31-36  

 

 Red Cell Distribution Width  13 %  N  10.5-15  

 

 Platelet Count  223 10^3/uL  N  150-450  

 

 Mean Platelet Volume  9 um3  N  7.4-10.4  

 

 Abs Neutrophils  5.0 10^3/uL  N  1.5-7.7  

 

 Abs Lymphocytes  1.2 10^3/uL  N  1.0-4.8  

 

 Abs Monocytes  0.4 10^3/uL  N  0-0.8  

 

 Abs Eosinophils  0.1 10^3/uL  N  0-0.6  

 

 Abs Basophils  0 10^3/uL  N  0-0.2  

 

 Abs Nucleated RBC  0 10^3/uL  N    

 

 Granulocyte %  74.8 %  N  38-83  

 

 Lymphocyte %  17.7 %  Low  25-47  

 

 Monocyte %  5.5 %  N  1-9  

 

 Eosinophil %  1.5 %  N  0-6  

 

 Basophil %  0.5 %  N  0-2  

 

 Nucleated Red Blood Cells %  0.1  N    









 Comp Metabolic Panel  10/27/2015  [facility]  Sodium  136 mmol/L  N  133-145  









 Potassium  3.6 mmol/L  N  3.5-5.0  

 

 Chloride  104 mmol/L  N  101-111  

 

 Co2 Carbon Dioxide  27 mmol/L  N  22-32  

 

 Anion Gap  5 mmol/L  N  2-11  

 

 Glucose  96 mg/dL  N    

 

 Blood Urea Nitrogen  8 mg/dL  N  6-24  

 

 Creatinine  0.64 mg/dL  N  0.51-0.95  

 

 BUN/Creatinine Ratio  12.5  N  8-20  

 

 Calcium  9.7 mg/dL  N  8.6-10.3  

 

 Total Protein  7.0 g/dL  N  6.4-8.9  

 

 Albumin  4.3 g/dL  N  3.2-5.2  

 

 Globulin  2.7 g/dL  N  2-4  

 

 Albumin/Globulin Ratio  1.6  N  1-3  

 

 Total Bilirubin  0.30 mg/dL  N  0.2-1.0  

 

 Alkaline Phosphatase  53 U/L  N    

 

 Alt  14 U/L  N  7-52  

 

 Ast  12 U/L  Low  13-39  









 Laboratory test finding  10/27/2015  [facility]  Magnesium  2.1 mg/dL  N  1.9-2.7  









 Serum Pregnancy  Negative  N  Negative  









 CBC Auto Diff  2015  [facility]  White Blood Count  6.1 10^3/uL  N  4.8-10.8  









 Red Blood Count  4.34 10^6/uL  N  4.0-5.4  

 

 Hemoglobin  13.0 g/dL  N  12.0-16.0  

 

 Hematocrit  39 %  N  35-47  

 

 Mean Corpuscular Volume  90 fL  N  80-97  

 

 Mean Corpuscular Hemoglobin  30 pg  N  27-31  

 

 Mean Corpuscular HGB Conc  33 g/dL  N  31-36  

 

 Red Cell Distribution Width  12 %  N  10.5-15  

 

 Platelet Count  219 10^3/uL  N  150-450  

 

 Mean Platelet Volume  8 um3  N  7.4-10.4  

 

 Abs Neutrophils  3.3 10^3/uL  N  1.5-7.7  

 

 Abs Lymphocytes  2.2 10^3/uL  N  1.0-4.8  

 

 Abs Monocytes  0.3 10^3/uL  N  0-0.8  

 

 Abs Eosinophils  0.2 10^3/uL  N  0-0.6  

 

 Abs Basophils  0.1 10^3/uL  N  0-0.2  

 

 Abs Nucleated RBC  0 10^3/uL  N    

 

 Granulocyte %  54.6 %  N  38-83  

 

 Lymphocyte %  35.5 %  N  25-47  

 

 Monocyte %  5.8 %  N  1-9  

 

 Eosinophil %  3.2 %  N  0-6  

 

 Basophil %  0.9 %  N  0-2  

 

 Nucleated Red Blood Cells %  0  N    









 Laboratory test finding  2015  [facility]  Lactic Acid  0.9 mmol/L  N  0.5-2.2  

 

 Comp Metabolic Panel  2015  [facility]  Sodium  135 mmol/L  N  133-145  









 Potassium  3.5 mmol/L  N  3.5-5.0  

 

 Chloride  105 mmol/L  N  101-111  

 

 Co2 Carbon Dioxide  26 mmol/L  N  22-32  

 

 Anion Gap  4 mmol/L  N  2-11  

 

 Glucose  104 mg/dL  High    

 

 Blood Urea Nitrogen  20 mg/dL  N  6-24  

 

 Creatinine  0.59 mg/dL  N  0.51-0.95  

 

 BUN/Creatinine Ratio  33.9  High  8-20  

 

 Calcium  9.2 mg/dL  N  8.6-10.3  

 

 Total Protein  6.2 g/dL  Low  6.4-8.9  

 

 Albumin  3.9 g/dL  N  3.2-5.2  

 

 Globulin  2.3 g/dL  N  2-4  

 

 Albumin/Globulin Ratio  1.7  N  1-3  

 

 Total Bilirubin  0.30 mg/dL  N  0.2-1.0  

 

 Alkaline Phosphatase  57 U/L  N    

 

 Alt  16 U/L  N  7-52  

 

 Ast  14 U/L  N  13-39  









 Inr/Protime  2015  [facility]  Inr  1.07  N  0.78-1.07  

 

 Laboratory test finding  2015  [facility]  Partial Thrombo  32.8 seconds  N  
26.0-36.3  



       Time PTT        









 Troponin I  0.00 ng/mL  N  <0.03  59

 

 Blood Culture  SEE RESULT BELOW      60









 Laboratory test finding  09/10/2015  [facility]  Urine Pregnancy  Negative  N  
Negative  61

 

 Urinalysis Profile  2015  [facility]  Urine Color  Yellow  N    









 Urine Appearance  Cloudy  N    

 

 Urine Specific Gravity  1.034  High  1.010-1.030  

 

 Urine pH  5.0  N  5-9  

 

 Urine Urobilinogen  Negative  N  Negative  

 

 Urine Ketones  Negative  N  Negative  

 

 Urine Protein  1+(30 mg/dL)  Abnormal  Negative  

 

 Urine Leukocytes  Negative  N  Negative  

 

 Urine Blood  Negative  N  Negative  

 

 *  *  Abnormal  Negative  62

 

 Urine Nitrite  Negative  N  Negative  

 

 Urine Bilirubin  1+  Abnormal  Negative  

 

 Urine Glucose  Negative  N  Negative  

 

 Urine White Blood Cell  Absent  N  Absent  

 

 Urine Red Blood Cell  Absent  N  Absent  

 

 Urine Bacteria  Absent  N  Absent  

 

 Urine Squamous Epithelial Cell  Present  Abnormal  Absent  









 Laboratory test  2015  [facility]  Wound Culture/Sensi  SEE RESULT BELOW      63
, 64



 finding              









 Anaerobic Culture  SEE RESULT BELOW      65









 Laboratory test  2015  [facility]  Blood Culture  SEE RESULT BELOW      66, 67



 finding              

 

 Comp Metabolic-ALL Lab  2015  [facility]  Sodium  135 mmol/L  N  133-145  



 [facility]              









 Potassium  4.1 mmol/L  N  3.5-5.0  

 

 Chloride  105 mmol/L  N  101-111  

 

 Co2 Carbon Dioxide  24 mmol/L  N  22-32  

 

 Anion Gap  6 mmol/L  N  2-11  

 

 Glucose  75 mg/dL  N    

 

 Blood Urea Nitrogen  16 mg/dL  N  6-24  

 

 Creatinine  0.58 mg/dL  N  0.51-0.95  

 

 BUN/Creatinine Ratio  27.6  High  8-20  

 

 Calcium  9.3 mg/dL  N  8.6-10.3  

 

 Total Protein  6.8 g/dL  N  6.4-8.9  

 

 Albumin  4.3 g/dL  N  3.2-5.2  

 

 Globulin  2.5 g/dL  N  2-4  

 

 Albumin/Globulin Ratio  1.7  N  1-3  

 

 Total Bilirubin  0.30 mg/dL  N  0.2-1.0  

 

 Alkaline Phosphatase  59 U/L  N    

 

 Alt  14 U/L  N  7-52  

 

 Ast  14 U/L  N  13-39  









 Laboratory test finding  2015  [facility]  Blood Culture  SEE RESULT BELOW      
68









 C Reactive Protein  < 1.00 mg/L  N  < 5.00  69









 CBC Auto Diff  2015  [facility]  White Blood Count  6.7 10^3/uL  N  4.8-10.8  









 Red Blood Count  4.60 10^6/uL  N  4.0-5.4  

 

 Hemoglobin  13.6 g/dL  N  12.0-16.0  

 

 Hematocrit  42 %  N  35-47  

 

 Mean Corpuscular Volume  91 fL  N  80-97  

 

 Mean Corpuscular Hemoglobin  30 pg  N  27-31  

 

 Mean Corpuscular HGB Conc  32 g/dL  N  31-36  

 

 Red Cell Distribution Width  13 %  N  10.5-15  

 

 Platelet Count  251 10^3/uL  N  150-450  

 

 Mean Platelet Volume  9 um3  N  7.4-10.4  

 

 Abs Neutrophils  4.6 10^3/uL  N  1.5-7.7  

 

 Abs Lymphocytes  1.6 10^3/uL  N  1.0-4.8  

 

 Abs Monocytes  0.4 10^3/uL  N  0-0.8  

 

 Abs Eosinophils  0.2 10^3/uL  N  0-0.6  

 

 Abs Basophils  0 10^3/uL  N  0-0.2  

 

 Abs Nucleated RBC  0 10^3/uL  N    

 

 Granulocyte %  67.6 %  N  38-83  

 

 Lymphocyte %  23.8 %  Low  25-47  

 

 Monocyte %  5.6 %  N  1-9  

 

 Eosinophil %  2.6 %  N  0-6  

 

 Basophil %  0.4 %  N  0-2  

 

 Nucleated Red Blood Cells %  0  N    









 Laboratory test  2015  [facility]  Erythrocyte Sed  4 mm/Hr  N  0-14  



 finding      Rate        

 

 Urine Culture  2015  [facility]  Urine Culture,  Final report      70, 71



 Routine    [address] [facility]  Routine        



     [place] [address]          



     (607)-   -          









 Result 1  No growth      72









 Urine Pregnancy ([facility])  2015  [facility]  SP Grav  1.015      



     (607)-   -          









 Urine, Pregnancy ([facility])  NEGATIVE      









 Laboratory test finding  2015  [facility]  Magnesium  1.9 mg/dL  N  1.9-2.7  









 Lipase  7 U/L  Low  11.0-82.0  

 

 C Reactive Protein  < 1.00 mg/L  N  < 5.00  73

 

 Troponin I  0.00 ng/mL  N  <0.03  74

 

 TSH (Thyroid Stim Horm)  0.60 ?IU/mL  N  0.34-5.60  









 Urinalysis Profile  2015  [facility]  Urine Color  Yellow  N    









 Urine Appearance  Clear  N    

 

 Urine Specific Gravity  1.013  N  1.010-1.030  

 

 Urine pH  7.0  N  5-9  

 

 Urine Urobilinogen  Negative  N  Negative  

 

 Urine Ketones  1+  Abnormal  Negative  

 

 Urine Protein  Negative  N  Negative  

 

 Urine Leukocytes  Negative  N  Negative  

 

 Urine Blood  Negative  N  Negative  

 

 Urine Nitrite  Negative  N  Negative  

 

 Urine Bilirubin  Negative  N  Negative  

 

 Urine Glucose  Negative  N  Negative  









 CBC Auto Diff  2015  [facility]  White Blood Count  6.5 10^3/uL  N  4.8-10.8  









 Red Blood Count  4.74 10^6/uL  N  4.0-5.4  

 

 Hemoglobin  13.8 g/dL  N  12.0-16.0  

 

 Hematocrit  42 %  N  35-47  

 

 Mean Corpuscular Volume  88 fL  N  80-97  

 

 Mean Corpuscular Hemoglobin  29 pg  N  27-31  

 

 Mean Corpuscular HGB Conc  33 g/dL  N  31-36  

 

 Red Cell Distribution Width  12 %  N  10.5-15  

 

 Platelet Count  212 10^3/uL  N  150-450  

 

 Mean Platelet Volume  10 um3  N  7.4-10.4  

 

 Abs Neutrophils  4.4 10^3/uL  N  1.5-7.7  

 

 Abs Lymphocytes  1.7 10^3/uL  N  1.0-4.8  

 

 Abs Monocytes  0.2 10^3/uL  N  0-0.8  

 

 Abs Eosinophils  0 10^3/uL  N  0-0.6  

 

 Abs Basophils  0 10^3/uL  N  0-0.2  

 

 Abs Nucleated RBC  0 10^3/uL  N    

 

 Granulocyte %  67.9 %  N  38-83  

 

 Lymphocyte %  26.8 %  N  25-47  

 

 Monocyte %  3.8 %  N  1-9  

 

 Eosinophil %  0.7 %  N  0-6  

 

 Basophil %  0.8 %  N  0-2  

 

 Nucleated Red Blood Cells %  0  N    









 Laboratory test finding  2015  [facility]  Lactic Acid  1.6 mmol/L  N  0.5-2.2  









 B Type Natriuretic Peptide  26 pg/mL  N    75









 Inr/Protime  2015  [facility]  Inr  1.04  N  0.78-1.07  

 

 Laboratory test finding  2015  [facility]  Activated Partial  31.4 seconds  N  
26.0-36.3  



       Thrombo Time        

 

 Comp Metabolic Panel  2015  [facility]  Sodium  135 mmol/L  N  133-145  









 Potassium  3.5 mmol/L  N  3.5-5.0  

 

 Chloride  105 mmol/L  N  101-111  

 

 Co2 Carbon Dioxide  22 mmol/L  N  22-32  

 

 Anion Gap  8 mmol/L  N  2-11  

 

 Glucose  126 mg/dL  High    

 

 Blood Urea Nitrogen  21 mg/dL  N  6-24  

 

 Creatinine  0.69 mg/dL  N  0.51-0.95  

 

 BUN/Creatinine Ratio  30.4  High  8-20  

 

 Calcium  9.5 mg/dL  N  8.6-10.3  

 

 Total Protein  6.6 g/dL  N  6.4-8.9  

 

 Albumin  4.1 g/dL  N  3.2-5.2  

 

 Globulin  2.5 g/dL  N  2-4  

 

 Albumin/Globulin Ratio  1.6  N  1-3  

 

 Total Bilirubin  0.60 mg/dL  N  0.2-1.0  

 

 Alkaline Phosphatase  76 U/L  N    

 

 Alt  22 U/L  N  7-52  

 

 Ast  14 U/L  N  13-39  









 Babesia Microti  2015  [facility]  Babesia microti  <1:10    Neg:<1:10  



 AB PN    [address] [place] [address] [phone]          









 Babesia microti IgG  <1:10    Neg:<1:10  76









 Ehrlichia AB  2015  [facility]  E. chaffeensis  Negative    Neg:<1:64  



 Panel (CTX)    [address]  ([facility]) IgG Titer        



     [place] [address] [phone]          









 E. chaffeensis ([facility]) IgM Titer  Negative    Neg:<1:20  77

 

 Hge IgG Titer  Negative    Neg:<1:64  78

 

 Hge IgM Titer  Negative    Neg:<1:20  79









 Bartonella AB  2015  [facility]  B. henselae  Negative    Neg:<1:320  



 Panel    [address]  IgG  titer      



     [place] [address] [phone]          









 B. henselae IgM  Negative titer    Neg:<1:100  

 

 B. rodriguez IgG  Negative titer    Neg:<1:320  

 

 B. rodriguez IgM  Negative titer    Neg:<1:100  80









 Laboratory test finding  2015  [facility]  Troponin I  0.00 ng/mL  N  <0.03  81

 

 CBC Auto Diff  2015  [facility]  White Blood Count  4.6 10^3/uL  Low  4.8-10.8  
82









 Red Blood Count  4.26 10^6/uL  N  4.0-5.4  

 

 Hemoglobin  12.9 g/dL  N  12.0-16.0  

 

 Hematocrit  38 %  N  35-47  

 

 Mean Corpuscular Volume  88 fL  N  80-97  

 

 Mean Corpuscular Hemoglobin  30 pg  N  27-31  

 

 Mean Corpuscular HGB Conc  34 g/dL  N  31-36  

 

 Red Cell Distribution Width  14 %  N  10.5-15  

 

 Platelet Count  217 10^3/uL  N  150-450  

 

 Mean Platelet Volume  9 um3  N  7.4-10.4  

 

 Abs Neutrophils  1.5 10^3/uL  N  1.5-7.7  

 

 Abs Lymphocytes  2.5 10^3/uL  N  1.0-4.8  

 

 Abs Monocytes  0.5 10^3/uL  N  0-0.8  

 

 Abs Eosinophils  0.1 10^3/uL  N  0-0.6  

 

 Abs Basophils  0 10^3/uL  N  0-0.2  

 

 Abs Nucleated RBC  0 10^3/uL  N    

 

 Granulocyte %  32.4 %  Low  38-83  

 

 Lymphocyte %  53.5 %  High  25-47  

 

 Monocyte %  10.8 %  High  1-9  

 

 Eosinophil %  2.5 %  N  0-6  

 

 Basophil %  0.8 %  N  0-2  

 

 Nucleated Red Blood Cells %  0.1  N    









 Laboratory test finding  2015  [facility]  Erythrocyte Sed Rate  8 mm/Hr  N  0-
14  83

 

 Comp Metabolic Panel  2015  [facility]  Sodium  137 mmol/L  N  133-145  









 Potassium  3.6 mmol/L  N  3.5-5.0  

 

 Chloride  106 mmol/L  N  101-111  

 

 Co2 Carbon Dioxide  23 mmol/L  N  22-32  

 

 Anion Gap  8 mmol/L  N  2-11  

 

 Glucose  74 mg/dL  N    

 

 Blood Urea Nitrogen  20 mg/dL  N  6-24  

 

 Creatinine  0.74 mg/dL  N  0.51-0.95  

 

 BUN/Creatinine Ratio  27.0  High  8-20  

 

 Calcium  9.7 mg/dL  N  8.6-10.3  

 

 Total Protein  7.2 g/dL  N  6.4-8.9  

 

 Albumin  4.5 g/dL  N  3.2-5.2  

 

 Globulin  2.7 g/dL  N  2-4  

 

 Albumin/Globulin Ratio  1.7  N  1-3  

 

 Total Bilirubin  0.20 mg/dL  N  0.2-1.0  

 

 Alkaline Phosphatase  73 U/L  N    

 

 Alt  28 U/L  N  7-52  

 

 Ast  21 U/L  N  13-39  









 Laboratory test finding  2015  [facility]  Magnesium  2.0 mg/dL  N  1.9-2.7  84









 Troponin I  0.00 ng/mL  N  <0.03  85









 Laboratory test finding  2015  [facility]  Lactic Acid  0.8 mmol/L  N  0.5-2.2  

 

 Comp Metabolic Panel  2015  [facility]  Sodium  136 mmol/L  N  133-145  









 Potassium  4.3 mmol/L  N  3.5-5.0  

 

 Chloride  107 mmol/L  N  101-111  

 

 Co2 Carbon Dioxide  24 mmol/L  N  22-32  

 

 Anion Gap  5 mmol/L  N  2-11  

 

 Glucose  79 mg/dL  N    

 

 Blood Urea Nitrogen  15 mg/dL  N  6-24  

 

 Creatinine  0.65 mg/dL  N  0.51-0.95  

 

 BUN/Creatinine Ratio  23.1  High  8-20  

 

 Calcium  9.3 mg/dL  N  8.6-10.3  

 

 Total Protein  6.9 g/dL  N  6.4-8.9  

 

 Albumin  4.2 g/dL  N  3.2-5.2  

 

 Globulin  2.7 g/dL  N  2-4  

 

 Albumin/Globulin Ratio  1.6  N  1-3  

 

 Total Bilirubin  0.30 mg/dL  N  0.2-1.0  

 

 Alkaline Phosphatase  63 U/L  N    

 

 Alt  21 U/L  N  7-52  

 

 Ast  17 U/L  N  13-39  









 Laboratory test finding  2015  [facility]  C Reactive Protein  < 1.00 mg/L  N  
< 5.00  86









 Erythrocyte Sed Rate  10 mm/Hr  N  0-14  









 CBC No Diff  2015  [facility] [place]  White Blood Count  5.0 10^3/uL  N  4.8-10.8  









 Red Blood Count  4.79 10^6/uL  N  4.0-5.4  

 

 Hemoglobin  13.9 g/dL  N  12.0-16.0  

 

 Hematocrit  42 %  N  35-47  

 

 Mean Corpuscular Volume  89 fL  N  80-97  

 

 Mean Corpuscular Hemoglobin  29 pg  N  27-31  

 

 Mean Corpuscular HGB Conc  33 g/dL  N  31-36  

 

 Red Cell Distribution Width  14 %  N  10.5-15  

 

 Platelet Count  234 10^3/uL  N  150-450  

 

 Mean Platelet Volume  10 um3  N  7.4-10.4  









 Laboratory test  2015  [facility] [place]  Lyme Disease  Negative  N  Negative  87



 finding      Serology        

 

 Laboratory test  2015  [facility]  [facility] Test  see scanned   
   



 finding              









 1  [id number]

 

 2  SEE RESULT BELOW



   -----------------------------------------------------------------------------
---------------



   Name:  BEBA ORLANDO               : 2000    Attend Dr: Fortino Garcia MD



   Acct:  [id number]  Unit: [id number]  AGE: 19            Location:  [facility]



   Re19                        SEX: F             Status:    DEP REF



   -----------------------------------------------------------------------------
---------------



   



   SPEC: [id number]         VOLODYMYR:       [facility] DR: Fortino Garcia MD



   REQ:  [id number]              RECD:   [id number]



   STATUS: ARIANA ONEIL DR: Tristan Delgado MD



   _



   SOURCE: GAS ANTRUM     SPDESC:



   ORDERED:  Clotest



   COMMENTS: [id number]



   



   -----------------------------------------------------------------------------
---------------



   Procedure                         Result                         Reported   
        Site



   -----------------------------------------------------------------------------
---------------



   Clotest  Final                                                   02/15/19-
744      ML



   Clotest                     Negative



   



   -----------------------------------------------------------------------------
---------------



   * ML - Main Lab



   .



   



   



   



   



   



   



   



   



   



   



   



   



   



   



   



   



   



   



   



   



   



   



   



   



   



   



   ** END OF REPORT **



   



   DEPARTMENT OF PATHOLOGY,  [address] [place] [address]



   Phone # [phone]      Fax #[phone]



   Darrell Dunlap M.D. Director     [facility] # [id number]

 

 3  [id number]

 

 4  SEE RESULT BELOW



   -----------------------------------------------------------------------------
---------------



   Name:  BEBA ORLANDO               : 2000    Attend Dr: Fortino Garcia MD



   Acct:  [id number]  Unit: [id number]  AGE: 19            Location:  [facility]



   Re19                        SEX: F             Status:    DEP REF



   -----------------------------------------------------------------------------
---------------



   



   SPEC: [id number]             VOLODYMYR:       [facility] DR: Fortino Garcia MD



   REQ:  [id number]             RECD: [id number]



   STATUS: JUDITH ONEIL DR: Tristan Delgado MD



   _



   ORDERED:  LEVEL 4



   COMMENTS: [id number]



   



   FINAL DIAGNOSIS



   



   



   Esophagus, biopsy:



   -- Benign squamous mucosa with mild erosive changes.



   -- No evidence of eosinophilic esophagitis.



   -- No columnar component present for evaluation.



   



   



   -----------------------------------------------------------------------------
---------------



   



   



   



   CLINICAL HISTORY



   



   Screening/Surveillance for malignancy in asymptomatic patient; dysphagia



   



   



   POST-OPERATIVE DIAGNOSIS



   



   EGD: esophagus- normal, biopsy; gastric - normal, biopsy; duodenum - normal



   



   



   GROSS DESCRIPTION



   



   The specimen is received in formalin labeled, Biopsy Esophagus, and consists 
of a 0.5 x 0.4



   x 0.1 cm aggregate of translucent tan-white irregular soft tissue fragments 
which is



   submitted entirely in one cassette.



   



   Signed by and Reported on: __________              Jo Collins MD 
02/15/19 1386



   



   -----------------------------------------------------------------------------
---------------



   



   



   



   



   ** END OF REPORT **



   



   DEPARTMENT OF PATHOLOGY,  [address] [place] [address]



   Phone # [phone]      Fax #[phone]



   Darrell Dunlap M.D. Director     [facility] # [id number]

 

 5  [id number]

 

 6  *******Because ethnic data is not always readily available,



   this report includes an eGFR for both -Americans and



   non- Americans.****



   The National Kidney Disease Education Program (NKDEP) does



   not endorse the use of the MDRD equation for patients that



   are not between the ages of 18 and 70, are pregnant, have



   extremes of body size, muscle mass, or nutritional status,



   or are non- or non-.



   According to the National Kidney Foundation, irrespective of



   diagnosis, the stage of the disease is based on the level of



   kidney function:



   Stage Description                      GFR(mL/min/1.73 m(2))



   1     Kidney damage with normal or decreased GFR       90



   2     Kidney damage with mild decrease in GFR          60-89



   3     Moderate decrease in GFR                         30-59



   4     Severe decrease in GFR                           15-29



   5     Kidney failure                       <15 (or dialysis)

 

 7  [id number]

 

 8  <5.0 Negative



   



   5.0 - 25.0  Indeterminate (Repeat testing recommended after



   72 hours)



   >25.0  Positive



   



   Perimenopausal women can display HCG levels of up to 20



   mIU/mL

 

 9  [id number]

 

 10  : [id number]



   If pregnancy is still suspected, please repeat test after



   48 to 72 hours.

 

 11  Test Performed by:



   [address] [address]

 

 12  SEE RESULT BELOW



   -----------------------------------------------------------------------------
---------------



   Name:  BEBA ORLANDO               : 2000    Attend Dr: Gemma Lazaro NP



   Acct:  [id number]  Unit: [id number]  AGE: 18            Location:  [facility]



   Re18                        SEX: F             Status:    REG REF



   -----------------------------------------------------------------------------
---------------



   



   SPEC: [id number]         VOLODYMYR:       [facility] DR: Gemma Lazaro NP



   REQ:  [id number]              RECD:   



   STATUS: COMP



   _



   SOURCE: URINE          SPDESC:



   ORDERED:  Urine Culture



   COMMENTS: 1 URINE GREY TUBE



   [id number]



   



   -----------------------------------------------------------------------------
---------------



   Procedure                         Result                         Reported   
        Site



   -----------------------------------------------------------------------------
---------------



   Urine Culture  Final                                             03/10/18-
922      ML



   



   Organism 1                     ESCHERICHIA COLI



   [place] Count                10-25,000 (Moderate) CFU/ML



   



   1. ESCHERICHIA COLI



   M.I.C.      RX



   --------- ------



   Ampicillin                     4           S



   Cefazolin                      <=4         S



   Cefepime                       <=1         S



   Ceftriaxone                    <=1         S



   Ciprofloxacin                  <=0.25      S



   Gentamicin                     <=1         S



   Levofloxacin                   <=0.12      S



   Meropenem                      <=0.25      S



   Nitrofurantoin                 <=16        S



   Tetracycline                   <=1         S



   Pipercillin/Tazobactam         <=4         S



   Trimethoprim/Sulfamethoxazole  <=20        S



   Amoxicillin/Clavulanic Acid    <=2         S



   Aztreonam                      <=1         S



   



   



   Contact the Microbiology Department for any additional



   antibiotic reporting.



   



   -----------------------------------------------------------------------------
---------------



   * ML - Main Lab



   .



   



   ** END OF REPORT **



   



   DEPARTMENT OF PATHOLOGY,  [address] [place] [address]



   Phone # [phone]      Fax #[phone]



   Darrell Dunlap M.D. Director     [facility] # [id number]

 

 13  1sst

 

 14  Positive: 5 of the following



   Borrelia-specific bands:



   18,23,28,30,39,41,45,58,



   66, and 93.



   Negative: No bands or banding



   patterns which do not



   meet positive criteria.

 

 15  Note: An equivocal or positive EIA result followed by a negative



   Western Blot result is considered NEGATIVE. An equivocal or positive



   EIA result followed by a positive Western Blot is considered POSITIVE



   by the CDC.



   Positive: 2 of the following bands: 23,39 or 41



   Negative: No bands or banding patterns which do not meet positive



   criteria.



   Criteria for positivity are those recommended by CDC/ASTPHLD.



   p23=Osp C, w65=tiewkzccw



   Note:



   Sera from individuals with the following may cross react in the



   Lyme Western Blot assays: other spirochetal diseases (periodontal



   disease, leptospirosis, relapsing fever, yaws, and pinta);



   connective autoimmune (Rheumatoid Arthritis and Systemic Lupus



   Erythematosus and also individuals with Antinuclear Antibody);



   other infections ([place] Spotted Fever; Nabeel-Barr Virus,



   and Cytomegalovirus).

 

 16  [facility] Severe Sepsis and Septic Shock Management Bundle Measure



   requires all lactic acids initially measuring >2.0 mmol/L be



   repeated.

 

 17  99th percentile=0.04 ng/mL



   



   Troponin results at [facility] and [facility] are not interchangeable.

 

 18  *******Because ethnic data is not always readily available,



   this report includes an eGFR for both -Americans and



   non- Americans.****



   The National Kidney Disease Education Program (NKDEP) does



   not endorse the use of the MDRD equation for patients that



   are not between the ages of 18 and 70, are pregnant, have



   extremes of body size, muscle mass, or nutritional status,



   or are non- or non-.



   According to the National Kidney Foundation, irrespective of



   diagnosis, the stage of the disease is based on the level of



   kidney function:



   Stage Description                      GFR(mL/min/1.73 m(2))



   1     Kidney damage with normal or decreased GFR       90



   2     Kidney damage with mild decrease in GFR          60-89



   3     Moderate decrease in GFR                         30-59



   4     Severe decrease in GFR                           15-29



   5     Kidney failure                       <15 (or dialysis)

 

 19  <5.0 Negative



   



   5.0 - 25.0  Indeterminate (Repeat testing recommended after



   72 hours)



   >25.0  Positive



   



   Perimenopausal women can display HCG levels of up to 20



   mIU/mL

 

 20  *******Because ethnic data is not always readily available,



   this report includes an eGFR for both -Americans and



   non- Americans.****



   The National Kidney Disease Education Program (NKDEP) does



   not endorse the use of the MDRD equation for patients that



   are not between the ages of 18 and 70, are pregnant, have



   extremes of body size, muscle mass, or nutritional status,



   or are non- or non-.



   According to the National Kidney Foundation, irrespective of



   diagnosis, the stage of the disease is based on the level of



   kidney function:



   Stage Description                      GFR(mL/min/1.73 m(2))



   1     Kidney damage with normal or decreased GFR       90



   2     Kidney damage with mild decrease in GFR          60-89



   3     Moderate decrease in GFR                         30-59



   4     Severe decrease in GFR                           15-29



   5     Kidney failure                       <15 (or dialysis)

 

 21  2 sst

 

 22  Negative:     < 0.8



   Indeterminate: 0.8 - 0.9



   Positive:     > 0.9



   The CDC recommends that a positive HCV antibody result



   be followed up with a HCV Nucleic Acid Amplification



   test (124759).

 

 23  SEE RESULT BELOW



   -----------------------------------------------------------------------------
---------------



   Name:  BEBA ORLANDO               : 2000    Attend Dr: Kalia Craig MD



   Acct:  [id number]  Unit: [id number]  AGE: 16            Location:  [facility]



   Re16                        SEX: F             Status:    DEP ER



   -----------------------------------------------------------------------------
---------------



   



   SPEC: [id number]         VOLODYMYR:       [facility] DR: Kalia Craig MD



   REQ:  [id number]              RECD:   



   STATUS: ARIANA ONEIL DR: Tristan Delgado MD



   _



   SOURCE: URINE          SPDESC:



   ORDERED:  Urine Culture



   



   -----------------------------------------------------------------------------
---------------



   Procedure                         Result                         Reported   
        Site



   -----------------------------------------------------------------------------
---------------



   Urine Culture  Final                                             16-
0752      ML



   



   No growth of clinically significant organisms



   



   -----------------------------------------------------------------------------
---------------



   * ML - MAIN LAB ([facility])



   .



   



   



   



   



   



   



   



   



   



   



   



   



   



   



   



   



   



   



   



   



   



   



   



   



   



   



   ** END OF REPORT **



   



   * ML=Testing performed at Main Lab



   DEPARTMENT OF PATHOLOGY,  [address] [place] [address]



   Phone # [phone]      Fax #[phone]



   Darrell Dunlap M.D. Director     [facility] # [id number]

 

 24  **Please note:



   The following may produce a false positive D Dimer test:



   - Rheumatoid factor greater than 60 IU/ml



   - Plasma hemoglobin greater than 0.05 gm/dl



   - Bilirubin greater than 50 mg/dl



   - Lipids greater than 1000 mg/dl



   - FDP greater than 20 ug/ml

 

 25  *******Because ethnic data is not always readily available,



   this report includes an eGFR for both -Americans and



   non- Americans.****



   The National Kidney Disease Education Program (NKDEP) does



   not endorse the use of the MDRD equation for patients that



   are not between the ages of 18 and 70, are pregnant, have



   extremes of body size, muscle mass, or nutritional status,



   or are non- or non-.



   According to the National Kidney Foundation, irrespective of



   diagnosis, the stage of the disease is based on the level of



   kidney function:



   Stage Description                      GFR(mL/min/1.73 m(2))



   1     Kidney damage with normal or decreased GFR       90



   2     Kidney damage with mild decrease in GFR          60-89



   3     Moderate decrease in GFR                         30-59



   4     Severe decrease in GFR                           15-29



   5     Kidney failure                       <15 (or dialysis)

 

 26  Acute inflammation:  >10.00

 

 27  Reference Range and Interpretation:



   TnI (ng/mL)             Interpretation



   Less Than 0.03 ng/mL    Not supportive of diagnosis of MI



   0.03 - 0.50 ng/mL       Indeterminate: suggest serial



   studies if clinically indicated.



   Greater than 0.5 ng/mL  Consistent with diagnosis of MI

 

 28  <5.0 Negative



   



   5.0 - 25.0  Indeterminate (Repeat testing recommended after



   72 hours)



   >25.0  Positive



   



   Perimenopausal women can display HCG levels of up to 20



   mIU/mL

 

 29  >100 to <200 pg/mL: likely compensated congestive heart



   failure (CHF)



   200 to 400 pg/mL: likely moderate CHF



   >400 pg/mL: likely moderate to severe CHF

 

 30  [place] Severe Sepsis and Septic Shock Management Bundle Measure



   requires all lactic acids initially measuring >2.0 mmol/L be



   repeated.

 

 31  1 SST

 

 32  A serum separator tube was received with no test indicated

 

 33  The requisition we received for the above patient has no test



   indicated on the request form for one or more of the specimens



   submitted.  The [place] Code of Federal Regulations requires a



   written and signed request be forwarded to the testing laboratory



   following the verbal order of a laboratory test.



   Date:_________________________________



   ICD-9/10 Diagnosis Code(s):___________________________________________



   Physician or Authorized Designee Signature:



   ______________________________________________________________________



   Your signature confirms your order of the test(s) listed



   Required test name(s):________________________________________________



   Required test number(s):______________________________________________



   Please provide requested information and fax to [phone]



   to expedite testing.

 

 34  No frozen plasma received.



   TEST:  [id number]  Angiotensin I

 

 35  Written Authorization Received.



   Authorization received from SIGNATURE ON FILE 2016



   Logged by Sydnie Baig

 

 36  *******Because ethnic data is not always readily available,



   this report includes an eGFR for both -Americans and



   non- Americans.****



   The National Kidney Disease Education Program (NKDEP) does



   not endorse the use of the MDRD equation for patients that



   are not between the ages of 18 and 70, are pregnant, have



   extremes of body size, muscle mass, or nutritional status,



   or are non- or non-.



   According to the National Kidney Foundation, irrespective of



   diagnosis, the stage of the disease is based on the level of



   kidney function:



   Stage Description                      GFR(mL/min/1.73 m(2))



   1     Kidney damage with normal or decreased GFR       90



   2     Kidney damage with mild decrease in GFR          60-89



   3     Moderate decrease in GFR                         30-59



   4     Severe decrease in GFR                           15-29



   5     Kidney failure                       <15 (or dialysis)

 

 37  <5.0 Negative



   



   5.0 - 25.0  Indeterminate (Repeat testing recommended after



   72 hours)



   >25.0  Positive



   



   Perimenopausal women can display HCG levels of up to 20



   mIU/mL

 

 38  Acute inflammation:  >10.00

 

 39  Reference Range and Interpretation:



   TnI (ng/mL)             Interpretation



   Less Than 0.03 ng/mL    Not supportive of diagnosis of MI



   0.03 - 0.50 ng/mL       Indeterminate: suggest serial



   studies if clinically indicated.



   Greater than 0.5 ng/mL  Consistent with diagnosis of MI

 

 40  *******Because ethnic data is not always readily available,



   this report includes an eGFR for both -Americans and



   non- Americans.****



   The National Kidney Disease Education Program (NKDEP) does



   not endorse the use of the MDRD equation for patients that



   are not between the ages of 18 and 70, are pregnant, have



   extremes of body size, muscle mass, or nutritional status,



   or are non- or non-.



   According to the National Kidney Foundation, irrespective of



   diagnosis, the stage of the disease is based on the level of



   kidney function:



   Stage Description                      GFR(mL/min/1.73 m(2))



   1     Kidney damage with normal or decreased GFR       90



   2     Kidney damage with mild decrease in GFR          60-89



   3     Moderate decrease in GFR                         30-59



   4     Severe decrease in GFR                           15-29



   5     Kidney failure                       <15 (or dialysis)

 

 41  Test Performed by:



   [facility] [place] [address]



   : Kalia Tinsley II, M.D., Ph.D.

 

 42  -------------------REFERENCE VALUE--------------------------



   7:00-9:00 am: 100-750



   3:00-5:00 pm: <401



   11:00 pm-Midnight: <100



   Test Performed by:



   [facility] [place] [address]



   : Kalia Tinsley II, M.D., Ph.D.

 

 43  -------------------REFERENCE VALUE--------------------------



   7:00-9:00 am: 100-750



   3:00-5:00 pm: <401



   11:00 pm-Midnight: <100



   Test Performed by:



   [place] [address]



   : Kalia Tinsley II, M.D., Ph.D.

 

 44  Test Performed by:



   [place] [address]



   : Kalia Tinsley II, M.D., Ph.D.

 

 45  SEE RESULT BELOW



   -----------------------------------------------------------------------------
---------------



   Name:  BEBA ORLANDO               : 2000    Attend Dr: Jens Us MD



   Acct:  [id number]  Unit: [id number]  AGE: 16            Location:  [facility]



   Re16                        SEX: F             Status:    REG ER



   -----------------------------------------------------------------------------
---------------



   



   SPEC: [id number]         VOLODYMYR:       [facility] DR: Jens Us MD



   REQ:  [id number]              RECD:   



   STATUS: ARIANA ONEIL DR: Tristan Delgado MD



   Visiting Nurse Service



   _



   SOURCE: THROAT         SPDESC:



   ORDERED:  Strep A Request



   



   -----------------------------------------------------------------------------
---------------



   Procedure                         Result                         Reported   
        Site



   -----------------------------------------------------------------------------
---------------



   Rapid Strep A Request  Final                                     16-
332      ML



   Specimen received for Rapid Strep A Molecular testing



   



   -----------------------------------------------------------------------------
---------------



   * ML - MAIN LAB ([facility])



   .



   



   



   



   



   



   



   



   



   



   



   



   



   



   



   



   



   



   



   



   



   



   



   



   



   



   



   ** END OF REPORT **



   



   * ML=Testing performed at Main Lab



   DEPARTMENT OF PATHOLOGY,  [address] [place] [address]



   Phone # [phone]      Fax #[phone]



   Darrell Dunlap M.D. Director     [facility] # [id number]

 

 46  SEE RESULT BELOW



   -----------------------------------------------------------------------------
---------------



   Name:  BEBA ORLANDO               : 2000    Attend Dr: Jens Us MD



   Acct:  [id number]  Unit: [id number]  AGE: 16            Location:  [facility]



   Re16                        SEX: F             Status:    REG ER



   -----------------------------------------------------------------------------
---------------



   



   SPEC: [id number]         VOLODYMYR:       [facility] DR: Jens Us MD



   REQ:  [id number]              RECD:   



   STATUS: ARIANA ONEIL DR: Tristan Delgado MD



   Visiting Nurse Service



   _



   SOURCE: NASAL          SPDESC:



   ORDERED:  Flu A B Request



   



   -----------------------------------------------------------------------------
---------------



   Procedure                         Result                         Reported   
        Site



   -----------------------------------------------------------------------------
---------------



   Rapid Influenza A   B Request  Final                             16-
0332      ML



   Specimen received for Influenza A/B Molecular testing



   



   -----------------------------------------------------------------------------
---------------



   * ML - MAIN LAB ([facility])



   .



   



   



   



   



   



   



   



   



   



   



   



   



   



   



   



   



   



   



   



   



   



   



   



   



   



   



   ** END OF REPORT **



   



   * ML=Testing performed at Main Lab



   DEPARTMENT OF PATHOLOGY,  [address] [place] [address]



   Phone # [phone]      Fax #[phone]



   Darrell Dunlap M.D. Director     [facility] # [id number]

 

 47  *******Because ethnic data is not always readily available,



   this report includes an eGFR for both -Americans and



   non- Americans.****



   The National Kidney Disease Education Program (NKDEP) does



   not endorse the use of the MDRD equation for patients that



   are not between the ages of 18 and 70, are pregnant, have



   extremes of body size, muscle mass, or nutritional status,



   or are non- or non-.



   According to the National Kidney Foundation, irrespective of



   diagnosis, the stage of the disease is based on the level of



   kidney function:



   Stage Description                      GFR(mL/min/1.73 m(2))



   1     Kidney damage with normal or decreased GFR       90



   2     Kidney damage with mild decrease in GFR          60-89



   3     Moderate decrease in GFR                         30-59



   4     Severe decrease in GFR                           15-29



   5     Kidney failure                       <15 (or dialysis)

 

 48  : [id number] [employer]

 

 49  : [employer]



   The  and regulatory agencies both recommend that



   a throat culture for beta strep be performed if a Rapid



   Group A Strep assay yields a negative result.  Therefore a



   culture will be automatically performed on all negative



   samples.

 

 50  SEE RESULT BELOW



   -----------------------------------------------------------------------------
---------------



   Name:  BEBA ORLANDO               : 2000    Attend Dr: Jens Us MD



   Acct:  [id number]  Unit: [id number]  AGE: 16            Location:  [facility]



   Re16                        SEX: F             Status:    DEP ER



   -----------------------------------------------------------------------------
---------------



   



   SPEC: [id number]         VOLODYMYR:       [facility] DR: Jens Us MD



   REQ:  [id number]              RECD:   



   STATUS: ARIANA ONEIL DR: Tristan Delgado MD



   Visiting Nurse Service



   _



   SOURCE: THROAT         SPDESC:



   ORDERED:  Throat Beta Str



   



   -----------------------------------------------------------------------------
---------------



   Procedure                         Result                         Reported   
        Site



   -----------------------------------------------------------------------------
---------------



   Throat Beta Strep Culture  Final                                 16-
836      ML



   Negative For Group A Beta Streptococcus



   



   -----------------------------------------------------------------------------
---------------



   * ML - MAIN LAB ([facility])



   .



   



   



   



   



   



   



   



   



   



   



   



   



   



   



   



   



   



   



   



   



   



   



   



   



   



   



   ** END OF REPORT **



   



   * ML=Testing performed at Main Lab



   DEPARTMENT OF PATHOLOGY,  [address] [place] [address]



   Phone # [phone]      Fax #[phone]



   Darrell Dunlap M.D. Director     [facility] # [id number]

 

 51  Reference Range and Interpretation:



   TnI (ng/mL)             Interpretation



   Less Than 0.03 ng/mL    Not supportive of diagnosis of MI



   0.03 - 0.50 ng/mL       Indeterminate: suggest serial



   studies if clinically indicated.



   Greater than 0.5 ng/mL  Consistent with diagnosis of MI

 

 52  <5.0 Negative



   



   5.0 - 25.0  Indeterminate (Repeat testing recommended after



   72 hours)



   >25.0  Positive



   



   Perimenopausal women can display HCG levels of up to 20



   mIU/mL

 

 53  >100 to <200 pg/mL: likely compensated congestive heart



   failure (CHF)



   200 to 400 pg/mL: likely moderate CHF



   >400 pg/mL: likely moderate to severe CHF

 

 54  Result TnIDx:0.89 Called to Smithers Avanza at: 19:45:01 by:[id number] Read back by:
[id number]



   Reference Range and Interpretation:



   TnI (ng/mL)             Interpretation



   Less Than 0.03 ng/mL    Not supportive of diagnosis of MI



   0.03 - 0.50 ng/mL       Indeterminate: suggest serial



   studies if clinically indicated.



   Greater than 0.5 ng/mL  Consistent with diagnosis of MI

 

 55  [facility] Severe Sepsis and Septic Shock Management Bundle Measure



   requires all lactic acids initially measuring >2.0mmol/L be



   repeated.

 

 56  SEE RESULT BELOW



   -----------------------------------------------------------------------------
---------------



   Name:  BEBA ORLANDO               : 2000    Attend Dr: Arron Quan MD



   Acct:  [id number]  Unit: [id number]  AGE: 15            Location:  [facility]



   Re/31/15                        SEX: F             Status:    DEP ER



   -----------------------------------------------------------------------------
---------------



   



   SPEC: [id number]         VOLODYMYR:   12/31/15-    [facility] DR: Arron Quan MD



   REQ:  [id number]              RECD:   12/31/



   STATUS: [facility] ONEIL DR: Tristan Delgado MD



   Visiting Nurse Service



   _



   SOURCE: BLOOD,VENO     SPDESC:



   ORDERED:  Blood Cult



   



   -----------------------------------------------------------------------------
---------------



   Procedure                         Result                         Reported   
        Site



   -----------------------------------------------------------------------------
---------------



   Aerobic Culture Bottle  Final                                    16-
      ML



   No Growth Day 5



   



   Anaerobic Culture Bottle  Final                                  16-
      ML



   No Growth Day 5



   



   -----------------------------------------------------------------------------
---------------



   * ML - MAIN LAB ([facility]1)



   .



   



   



   



   



   



   



   



   



   



   



   



   



   



   



   



   



   



   



   



   



   



   



   



   ** END OF REPORT **



   



   * ML=Testing performed at Main Lab



   DEPARTMENT OF PATHOLOGY,  [address] [place] [address]



   Phone # [phone]      Fax #[phone]



   Darrell Dunlap M.D. Director     [facility] # [id number]

 

 57  Normal Range 180 to 914



   Indeterminate Range 145 to 180



   Deficient Range  <145

 

 58  Acute inflammation:  >10.00

 

 59  Reference Range and Interpretation:



   TnI (ng/mL)             Interpretation



   Less Than 0.03 ng/mL    Not supportive of diagnosis of MI



   0.03 - 0.50 ng/mL       Indeterminate: suggest serial



   studies if clinically indicated.



   Greater than 0.5 ng/mL  Consistent with diagnosis of MI

 

 60  SEE RESULT BELOW



   -----------------------------------------------------------------------------
---------------



   Name:  BEBA ORLANDO               : 2000    Attend Dr: Arron Quan MD



   Acct:  [id number]  Unit: [id number]  AGE: 15            [facility]  [facility]



   Re/25/15                        SEX: F             Status:    DEP ER



   -----------------------------------------------------------------------------
---------------



   



   SPEC: [id number]         VOLODYMYR:   09/25/    [facility] DR: Arron Quan MD



   REQ:  [id number]              RECD:   09/25/



   STATUS: [facility] ONEIL DR: Tristan Delgado MD



   Visiting Nurse Service



   _



   SOURCE: BLOOD,VENO     SPDES:



   ORDERED:  Blood Cult



   



   -----------------------------------------------------------------------------
---------------



   Procedure                         Result                         Verified   
        Site



   -----------------------------------------------------------------------------
---------------



   Aerobic Culture Bottle  Final                                    09/30/15-
      ML



   No Growth Day 5



   



   Anaerobic Culture Bottle  Final                                  09/30/15-
      ML



   No Growth Day 5



   



   -----------------------------------------------------------------------------
---------------



   * ML - MAIN LAB ([facility])



   .



   



   



   



   



   



   



   



   



   



   



   



   



   



   



   



   



   



   



   



   



   



   



   



   ** END OF REPORT **



   



   * ML=Testing performed at Main Lab



   DEPARTMENT OF PATHOLOGY,  [address] [place] [address]



   Phone # [phone]      Fax #[phone]



   Darrell Dunlap M.D. Director     [facility] # [id number]

 

 61  If pregnancy is still suspected, please repeat test after



   48 to 72 hours.



   This test detects intact HCG only and is indicated for the



   early detection of pregnancy.

 

 62  *Ascorbic acid is present which may interfere with detection



   of blood.

 

 63  RIGHT ARM PICC INSERTION SITE

 

 64  SEE RESULT BELOW



   -----------------------------------------------------------------------------
---------------



   Name:  BEBA ORLANDO               : 2000    Attend Dr: Jacquelyn Ramos MD



   Acct:  [id number]  Unit: [id number]  AGE: 15            Location:  [facility]



   Re/01/15                        SEX: F             Status:    REG REF



   -----------------------------------------------------------------------------
---------------



   



   SPEC: [id number]         VOLODYMYR:   09/01/    SU[facility] DR: Jacquelyn Ramos MD



   REQ:  [id number]              RECD:   09/01/



   STATUS: RES



   _



   SOURCE: WOUND          SPDESC:



   ORDERED:  Anaerobic Cult, Culture   Stain



   COMMENTS: RIGHT ARM PICC INSERTION SITE



   QUERIES:  Specimen Description RIGHT ARM



   



   -----------------------------------------------------------------------------
---------------



   Procedure                         Result                         Verified   
        Site



   -----------------------------------------------------------------------------
---------------



   Anaerobic Culture



   PENDING



   



   Wound/Misc Gram Stain  Final                                     09/02/15-
0800      ML



   No Neutrophils Observed



   



   No Organisms Seen



   



   Wound/Misc Culture



   PENDING



   



   -----------------------------------------------------------------------------
---------------



   * ML - MAIN LAB ([facility]1)



   .



   



   



   



   



   



   



   



   



   



   



   



   



   



   



   



   



   



   ** END OF REPORT **



   



   * ML=Testing performed at Main Lab



   DEPARTMENT OF PATHOLOGY,  [address] [place] [address]



   Phone # [phone]      Fax #[phone]



   Darrell Dunlap M.D. Director     [facility] # [id number]

 

 65  SEE RESULT BELOW



   -----------------------------------------------------------------------------
---------------



   Name:  BEBA ORLANDO               : 2000    Attend Dr: Jacquelyn Ramos MD



   Acct:  [id number]  Unit: [id number]  AGE: 15            Location:  [facility]



   Re/01/15                        SEX: F             Status:    REG REF



   -----------------------------------------------------------------------------
---------------



   



   SPEC: [id number]         VOLODYMYR:   09/01/15-    [facility] DR: Jacquelyn Raoms MD



   REQ:  [id number]              RECD:   09/01/



   STATUS: COMP



   _



   SOURCE: WOUND          SPDESC:



   ORDERED:  Anaerobic Cult, Culture   Stain



   COMMENTS: RIGHT ARM PICC INSERTION SITE



   QUERIES:  Specimen Description RIGHT ARM



   



   -----------------------------------------------------------------------------
---------------



   Procedure                         Result                         Verified   
        Site



   -----------------------------------------------------------------------------
---------------



   Anaerobic Culture  Final                                         09/05/15-
1048      ML



   No Growth Day 4



   



   Wound/Misc Gram Stain  Final                                     09/02/15-
0800      ML



   No Neutrophils Observed



   



   No Organisms Seen



   



   Wound/Misc Culture  Final                                        09/03/15-
1125      ML



   No Growth Day 2



   



   -----------------------------------------------------------------------------
---------------



   * ML - MAIN LAB ([facility])



   .



   



   



   



   



   



   



   



   



   



   



   



   



   



   



   



   



   



   ** END OF REPORT **



   



   * ML=Testing performed at Main Lab



   DEPARTMENT OF PATHOLOGY,  [address] [place] [address]



   Phone # [phone]      Fax #[phone]



   Darrell Dunlap M.D. Director     [facility] # [id number]

 

 66  PT ON CEFTRIAXONE  BLOOD CULTURE FROM PICC LINE

 

 67  SEE RESULT BELOW



   -----------------------------------------------------------------------------
---------------



   Name:  BEBA ORLANDO               : 2000    Attend Dr: Tristan Delgado MD



   Acct:  [id number]  Unit: [id number]  AGE: 15            Location:  [facility]



   Re/31/15                        SEX: F             Status:    REG REF



   -----------------------------------------------------------------------------
---------------



   



   SPEC: [id number]         VOLODYMYR:   08/31/15-    [facility] DR: Tristan Delgado MD



   REQ:  [id number]              RECD:   08/31/15-



   STATUS: COMP             OTHR DR: Visiting Nurse Service



   _



   SOURCE: BLOOD,LINE     SPDESC:



   ORDERED:  Blood Cult



   COMMENTS: PT ON CEFTRIAXONE



   BLOOD CULTURE FROM PICC LINE



   



   -----------------------------------------------------------------------------
---------------



   Procedure                         Result                         Verified   
        Site



   -----------------------------------------------------------------------------
---------------



   Aerobic Culture Bottle  Final                                    09/05/15-
2      ML



   No Growth Day 5



   



   Anaerobic Culture Bottle  Final                                  09/05/15-
1822      ML



   No Growth Day 5



   



   -----------------------------------------------------------------------------
---------------



   * ML - [facility] LAB ([facility])



   .



   



   



   



   



   



   



   



   



   



   



   



   



   



   



   



   



   



   



   



   



   



   



   ** END OF REPORT **



   



   * ML=Testing performed at Main Lab



   DEPARTMENT OF PATHOLOGY,  [address] [place] [address]



   Phone # [phone]      Fax #[phone]



   Darrell Dunlap M.D. Director     [facility] # [id number]

 

 68  SEE RESULT BELOW



   -----------------------------------------------------------------------------
---------------



   Name:  BEBA ORLANDO               : 2000    Attend Dr: Jacquelyn Ramos MD



   Acct:  [id number]  Unit: [id number]  AGE: 15            Location:  [facility]



   Re/31/15                        SEX: F             Status:    REG REF



   -----------------------------------------------------------------------------
---------------



   



   SPEC: [id number]         VOLODYMYR:   08/31/    [facility] DR: Jacquelyn Ramos MD



   REQ:  [id number]              RECD:   08/31/



   STATUS: COMP



   _



   SOURCE: BLOOD,VENO     SPDESC:



   ORDERED:  Blood Cult



   



   -----------------------------------------------------------------------------
---------------



   Procedure                         Result                         Verified   
        Site



   -----------------------------------------------------------------------------
---------------



   Aerobic Culture Bottle  Final                                    09/05/15-
      ML



   No Growth Day 5



   



   Anaerobic Culture Bottle  Final                                  09/05/15-
      ML



   No Growth Day 5



   



   -----------------------------------------------------------------------------
---------------



   * ML - MAIN LAB ([facility])



   .



   



   



   



   



   



   



   



   



   



   



   



   



   



   



   



   



   



   



   



   



   



   



   



   ** END OF REPORT **



   



   * ML=Testing performed at Main Lab



   DEPARTMENT OF PATHOLOGY,  [address] [place] [address]



   Phone # [phone]      Fax #[phone]



   Darrell Dunlap M.D. Director     [facility] # [id number]

 

 69  Acute inflammation:  >10.00

 

 70  SRC:URINE 1  URINE IN VACU EMMIE

 

 71  Source of Specimen: URINE 1  URINE IN VACU

 

 72  Source of Specimen: URINE 1  URINE IN VACU

 

 73  Acute inflammation:  >10.00

 

 74  Reference Range and Interpretation:



   TnI (ng/mL)             Interpretation



   Less Than 0.03 ng/mL    Not supportive of diagnosis of MI



   0.03 - 0.50 ng/mL       Indeterminate: suggest serial



   studies if clinically indicated.



   Greater than 0.5 ng/mL  Consistent with diagnosis of MI

 

 75  >100 to <200 pg/mL: likely compensated congestive heart



   failure (CHF)



   200 to 400 pg/mL: likely moderate CHF



   >400 pg/mL: likely moderate to severe CHF

 

 76  This test was developed and its performance characteristics determined



   by [employer]. It has not been cleared or approved by the



   U.S. Food and Drug Administration. The FDA has determined that such



   clearance or approval is not necessary. This test is used for clinical



   purposes. It should not be regarded as investigational or research.

 

 77  IgG titers if 1:64 or greater indicate exposure or  acute and



   convalescent samples showing a four-fold increase, and/or the presence



   of IgM indicate recent or current infection.

 

 78  HGE IgG levels are detectable 7 to 10 days post infection and persist



   approximately one year.

 

 79  IgM levels usually rise 3 to 5 days post infection and fall to normal



   levels in approximately 30 to 60 days.

 

 80  Note: Bartonella henselae is now regarded as the etiologic agent of



   Cat Scratch Disease, bacillary angiomatosis, endocarditis and fever



   with bacteremia.  Bartonella rodriguez also causes bacillary



   angiomatosis particularly among immunocompromised patients, and trench



   fever.



   This test was developed and its performance characteristics determined



   by [facility].  It has not been cleared or approved by the Food and Drug



   Administration.  The FDA has determined that such clearance or



   approval is not necessary.

 

 81  Reference Range and Interpretation:



   TnI (ng/mL)             Interpretation



   Less Than 0.03 ng/mL    Not supportive of diagnosis of MI



   0.03 - 0.50 ng/mL       Indeterminate: suggest serial



   studies if clinically indicated.



   Greater than 0.5 ng/mL  Consistent with diagnosis of MI

 

 82  CALLED RAND @ 2000 FOR LAV (QNS)

 

 83  CALLED RAND @ 2000 FOR LAV (QNS)

 

 84  [MG] affected by ICTERUS

 

 85  Reference Range and Interpretation:



   TnI (ng/mL)             Interpretation



   Less Than 0.03 ng/mL    Not supportive of diagnosis of MI



   0.03 - 0.50 ng/mL       Indeterminate: suggest serial



   studies if clinically indicated.



   Greater than 0.5 ng/mL  Consistent with diagnosis of MI

 

 86  Acute inflammation:  >10.00

 

 87  Serologic response to B. burgdorferi infection is not



   detected, but cannot rule out early infection during



   which low or undetectable antibody levels to



   B. burgdorferi may be present. If clinically indicated,



   a new serum specimen should be submitted in 7-14 days.



   Test Performed by:



   [address] [address]



   : Kalia Tinsley II, M.D., Ph.D.







Procedures







 Date  Code  Description  Status

 

 2019  83325  Destruction-1 Beign Lesion  Completed

 

 2017  24535  Pulse Oximetry  Completed

 

 2017  99580  Vision Test- screening test of visual acuity,  Completed



     quantitative, bila  

 

 2016  28775  Electrocardiogram Complete  Completed

 

 2015  32658  Electrocardiogram Complete  Completed

 

 2015  14547  Electrocardiogram Complete  Completed

 

 2015  75674  Electrocardiogram Complete  Completed







Encounters







 Type  Date  Location  Provider  Dx  Diagnosis

 

 Office Visit  10/15/2018  Main Office  Tristan Delgado  J06.9  Acute upper



   4:40p    M.D.    respiratory



           infection,



           unspecified









 J20.9  Acute bronchitis, unspecified

 

 R07.89  Other chest pain









 Office Visit  2018  3:00p  Main Office  Dionne Dickey  Z11.1  
Encounter for



       Afnp-C    screening for



           respiratory



           tuberculosis

 

 Office Visit  2018  2:30p  Main Office  SHANNAN Baxter23  
Encounter for



       Afnp-C    immunization









 Z00.00  Encntr for general adult medical exam w/o abnormal findings

 

 Z11.1  Encounter for screening for respiratory tuberculosis









 Office Visit  2018  6:30p  Main Office  Brenda Mora  J02.9  Acute 
pharyngitis,



       Major, NP    unspecified

 

 Office Visit  2018  3:00p  [facility] Office  Brenda Mora  F43.0  Acute stress



       Major, NP    reaction

 

 Office Visit  2018  8:45a  [facility]  Gemma CHAWLA  R30.0  Dysuria



       Tejas, NP    









 B35.4  Tinea corporis









 Office Visit  2017  2:00p  [facility]  Tristan GRANADOS90.0  Attn-luke Delgado M.D.    hyperactivity



           disorder, predom



           inattentive type









 I47.2  Ventricular tachycardia

 

 F43.12  Post-traumatic stress disorder, chronic









 Office Visit  10/24/2017 11:20a  Main Office  Tristan T. Midura,  F90.0  Attn-
defct



       M.D.    hyperactivity



           disorder, predom



           inattentive type









 I47.2  Ventricular tachycardia

 

 F43.12  Post-traumatic stress disorder, chronic









 Office Visit  2017  3:40p  [facility]  Jacquelyn Ramos  J06.9  Acute 
upper



       M.D.    respiratory



           infection,



           unspecified

 

 Office Visit  2017  8:20p  [facility] Office  Tristan STAUFFER  Z00.00  Encntr for 
general



       ROLAN Delgado    adult medical exam



           w/o abnormal



           findings









 A69.20  Lyme disease, unspecified

 

 I47.2  Ventricular tachycardia

 

 F41.1  Generalized anxiety disorder

 

 F43.12  Post-traumatic stress disorder, chronic

 

 R82.99  Other abnormal findings in urine









 Office Visit  2017 11:00a  Main Office  Nicho JURADO  A69.20  Lyme diseaseAline M.D.    unspecified

 

 Office Visit  2017  4:00p  [facility] Office  Tristan Delgado,  N64.4  
Mastodynia



       M.D.    









 I47.2  Ventricular tachycardia









 Office Visit  10/18/2016 10:30a  [facility] Office  Dionne Dickey,  R94.6  
Abnormal results



       Afnp-C    of thyroid



           function studies









 M79.675  Pain in left toe(s)

 

 Z79.3  Long term (current) use of hormonal contraceptives









 Office Visit  2016  8:20a  [facility]  Tristan Delgado,  R53.83  
Other fatigue



       M.D.    









 I47.2  Ventricular tachycardia

 

 A69.20  Lyme disease, unspecified

 

 Z72.51  High risk heterosexual behavior

 

 Z32.02  Encounter for pregnancy test, result negative









 Office Visit  2016  2:00p  [facility]  AMY Reich06.9  Acute 
upper



       FNP    respiratory



           infection,



           unspecified

 

 Office Visit  2016  2:20p  [facility]  Tristan Delgado,  I47.2  
Ventricular



       M.D.    tachycardia









 A69.20  Lyme disease, unspecified

 

 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









 Office Visit  2016  4:00p  [facility] Office  Tristan Delgado,  R07.89  Other 
chest pain



       M.D.    

 

 Office Visit  2016  1:10p  [facility] Office  Tristan Delgado,  A69.20  Lyme 
disease,



       MCandidoDCandido    unspecified









 I47.2  Ventricular tachycardia









 Office Visit  2016 11:20a  [facility]  Tristan Delgado,  A69.20  Lyme 
disease,



       M.D.    unspecified









 I47.2  Ventricular tachycardia









 Office Visit  2016 11:20a  [facility] Office  Tristan Delgado,  A69.20  Lyme 
disease,



       M.D.    unspecified









 I47.2  Ventricular tachycardia









 Office Visit  01/15/2016  2:10p  [facility] Office  Tristan Delgado,  A09  Infectious



       M.D.    gastroenteritis and



           colitis, unspecified









 I47.2  Ventricular tachycardia









 Office Visit  2016  4:20p  [facility] Office  Tristan Delgado  I47.2  
Ventricular



       M.D.    tachycardia









 J06.9  Acute upper respiratory infection, unspecified

 

 M79.1  Myalgia









 Office Visit  2015  2:00p  [facility] Office  Tristan Delgado,  J06.9  Acute 
upper



       M.D.    respiratory



           infection,



           unspecified









 I47.2  Ventricular tachycardia

 

 A69.20  Lyme disease, unspecified

 

 M79.1  Myalgia

 

 M06.4  Inflammatory polyarthropathy









 Office Visit  2015 11:15a  [facility] Office  Tristan Delgado  I47.2  
Ventricular



       M.D.    tachycardia

 

 Office Visit  2015  4:40p  [facility]  Tristan Delgado,  A69.20  Lyme 
disease,



       M.D.    unspecified









 I47.2  Ventricular tachycardia









 Office Visit  2015  1:15p  [facility]  Jo  I47.2  
Ventricular



       Giovany, FNP    tachycardia









 A69.29  Other conditions associated with Lyme disease

 

 B37.3  Candidiasis of vulva and vagina

 

 Z00.129  Encntr for routine child health exam w/o abnormal findings

 

 R10.30  Lower abdominal pain, unspecified









 Office Visit  10/12/2015  2:10p  [facility] Office  Tristan Delgado,  A69.29  Other 
conditions



       M.D.    associated with



           Lyme disease









 I47.2  Ventricular tachycardia









 Office Visit  2015  2:45p  [facility] Office  HOWARD Reich  728.89  
Muscle Disorders



           Other









 787.02  Nausea Alone









 Office Visit  2015  5:40p  [facility] Office  Tristan STAUFFER  088.81  Lyme Disease



       Sandy MCandidoD.    

 

 Office Visit  2015  3:10p  [facility] Office  Jacoby Taylor  088.81  Lyme Disease



       M.D.    

 

 Office Visit  2015 11:20a  [facility]  Jacquelyn Ramos,  626.1  
Menstruation



       M.D.    Scanty Or



           Infrequent









 088.81  Lyme Disease

 

 780.60  Fever, Unspecified

 

 788.1  Dysuria









 Office Visit  2015 10:00a  [facility] Office  Tristan Delgado M.D.  088.81  
Lyme Disease









 427.1  Paroxysmal Ventricular Tachycardia









 Office Visit  2015  3:10p  [facility] Office  Tristan Delgado M.D.  924.20  
Contusion Foot









 893.0  Open Wound Toe(S) W/O Complication

 

 088.81  Lyme Disease

 

 E918  Accidentally Caught In Or Between Objects









 Office Visit  2015  1:40p  [facility]  Tristan Delgado  088.81  
Lyme Disease



       M.DCandido    









 427.1  Paroxysmal Ventricular Tachycardia

 

 789.06  Pain Abdominal Epigastric









 Office Visit  2015  4:20p  [facility] Office  Tristan Delgado M.D.  088.81  
Lyme Disease









 427.1  Paroxysmal Ventricular Tachycardia

 

 465.9  URI  Upper Respiratory Infections Acute Unspec Sites









 Office Visit  2015  2:40p  [facility] Office  Tristan Delgado M.D.  088.81  
Lyme Disease









 427.1  Paroxysmal Ventricular Tachycardia









 Office Visit  2015 11:40a  [facility]  Tristan Delgado  088.81  
Lyme Disease



       M.DCandido    









 427.1  Paroxysmal Ventricular Tachycardia









 Office Visit  2015  2:40p  [facility]  Tristan Delgado M.D.  088.81  
Lyme Disease









 427.1  Paroxysmal Ventricular Tachycardia

 

 789.06  Pain Abdominal Epigastric









 Office Visit  2015  9:40a  [facility]  Tristan Delgado M.D.  088.81  
Lyme Disease









 427.1  Paroxysmal Ventricular Tachycardia









 Office Visit  2015  2:20p  [facility]  Tristan Delgado  088.81  
Lyme Disease



       M.DCandido    









 427.1  Paroxysmal Ventricular Tachycardia







Plan of Treatment

2019 - Jo Adair, FNPD22.5 Melanocytic nevi of trunkComments:we 
will refer you to dermatology, Dr Meier